# Patient Record
Sex: MALE | Race: WHITE | Employment: FULL TIME | URBAN - METROPOLITAN AREA
[De-identification: names, ages, dates, MRNs, and addresses within clinical notes are randomized per-mention and may not be internally consistent; named-entity substitution may affect disease eponyms.]

---

## 2017-01-25 ENCOUNTER — OFFICE VISIT (OUTPATIENT)
Dept: FAMILY MEDICINE CLINIC | Age: 47
End: 2017-01-25

## 2017-01-25 VITALS
BODY MASS INDEX: 33.39 KG/M2 | TEMPERATURE: 97.9 F | HEIGHT: 65 IN | OXYGEN SATURATION: 98 % | HEART RATE: 88 BPM | RESPIRATION RATE: 18 BRPM | DIASTOLIC BLOOD PRESSURE: 90 MMHG | WEIGHT: 200.4 LBS | SYSTOLIC BLOOD PRESSURE: 140 MMHG

## 2017-01-25 DIAGNOSIS — I10 HTN (HYPERTENSION), BENIGN: Primary | ICD-10-CM

## 2017-01-25 DIAGNOSIS — E78.49 OTHER HYPERLIPIDEMIA: ICD-10-CM

## 2017-01-25 DIAGNOSIS — R73.02 GLUCOSE INTOLERANCE (IMPAIRED GLUCOSE TOLERANCE): ICD-10-CM

## 2017-01-25 DIAGNOSIS — F41.9 ANXIETY: ICD-10-CM

## 2017-01-25 DIAGNOSIS — R79.89 LOW VITAMIN D LEVEL: ICD-10-CM

## 2017-01-25 PROBLEM — E78.5 HYPERLIPIDEMIA: Status: ACTIVE | Noted: 2017-01-25

## 2017-01-25 RX ORDER — METOPROLOL SUCCINATE 100 MG/1
100 TABLET, EXTENDED RELEASE ORAL DAILY
Qty: 90 TAB | Refills: 0 | Status: SHIPPED | OUTPATIENT
Start: 2017-01-25 | End: 2017-05-01 | Stop reason: SDUPTHER

## 2017-01-25 RX ORDER — PAROXETINE HYDROCHLORIDE 20 MG/1
60 TABLET, FILM COATED ORAL DAILY
Qty: 90 TAB | Refills: 3 | Status: SHIPPED | OUTPATIENT
Start: 2017-01-25 | End: 2017-05-01 | Stop reason: SDUPTHER

## 2017-01-25 RX ORDER — ATORVASTATIN CALCIUM 20 MG/1
20 TABLET, FILM COATED ORAL DAILY
Qty: 90 TAB | Refills: 1 | Status: SHIPPED | OUTPATIENT
Start: 2017-01-25 | End: 2017-01-27

## 2017-01-25 RX ORDER — ATORVASTATIN CALCIUM 20 MG/1
TABLET, FILM COATED ORAL
COMMUNITY
Start: 2016-11-23 | End: 2017-01-25 | Stop reason: SDUPTHER

## 2017-01-25 NOTE — PROGRESS NOTES
1. Have you been to the ER, urgent care clinic since your last visit? Hospitalized since your last visit? No    2. Have you seen or consulted any other health care providers outside of the [facility] [place] since your last visit? Include any pap smears or colon screening.  No   Chief Complaint   Patient presents with    Hypertension     follow up    Cholesterol Problem     follow up    Vitamin D Deficiency     follow up    Diabetes     prediabetes follow up     Chief Complaint   Patient presents with    Hypertension     follow up    Cholesterol Problem     follow up    Vitamin D Deficiency     follow up    Diabetes     prediabetes follow up     Learning Assessment 11/27/2012   PRIMARY LEARNER Patient   PRIMARY LANGUAGE ENGLISH   LEARNER PREFERENCE PRIMARY READING   ANSWERED BY patient   RELATIONSHIP SELF

## 2017-01-25 NOTE — PROGRESS NOTES
HISTORY OF PRESENT ILLNESS  Jorge Beltran is a 40 y.o. male. HPI: Cardiovascular Review  The patient has diabetes, hypertension and hyperlipidemia. He reports taking medications as instructed, no medication side effects noted. Diet and Lifestyle: generally follows a low fat low cholesterol diet, generally follows a low sodium diet, no formal exercise but active during the day. Lab review: labs reviewed and discussed with patient. Requesting refill on his medication. Past Medical History   Diagnosis Date    Anxiety disorder     Sleep apnea 11/2015   No Known Allergies    Current Outpatient Prescriptions:     atorvastatin (LIPITOR) 20 mg tablet, Take 1 Tab by mouth daily. , Disp: 90 Tab, Rfl: 1    PARoxetine (PAXIL) 20 mg tablet, Take 3 Tabs by mouth daily. , Disp: 90 Tab, Rfl: 3    metoprolol succinate (TOPROL-XL) 100 mg tablet, Take 1 Tab by mouth daily. , Disp: 90 Tab, Rfl: 0  Review of Systems   Constitutional: Negative. Respiratory: Negative. Cardiovascular: Negative. Gastrointestinal: Negative. Genitourinary: Negative. Blood pressure 140/90, pulse 88, temperature 97.9 °F (36.6 °C), temperature source Oral, resp. rate 18, height 5' 5\" (1.651 m), weight 200 lb 6.4 oz (90.9 kg), SpO2 98 %. Physical Exam   Constitutional: No distress. HENT:   Mouth/Throat: Oropharynx is clear and moist.   Neck: Neck supple. Cardiovascular: Normal rate and regular rhythm. No murmur heard. Pulmonary/Chest: Effort normal and breath sounds normal.   Abdominal: Soft. Bowel sounds are normal.   Nursing note and vitals reviewed. ASSESSMENT and PLAN    ICD-10-CM ICD-9-CM    1. HTN (hypertension), benign I10 401.1 CBC W/O DIFF      metoprolol succinate (TOPROL-XL) 100 mg tablet   2. Other hyperlipidemia E78.4 272.4 LIPID PANEL      METABOLIC PANEL, COMPREHENSIVE      atorvastatin (LIPITOR) 20 mg tablet   3.  Low vitamin D level E55.9 268.9 VITAMIN D, 25 HYDROXY   4. Glucose intolerance (impaired glucose tolerance) R73.02 790.22 HEMOGLOBIN A1C WITH EAG   5.  Anxiety F41.9 300.00 PARoxetine (PAXIL) 20 mg tablet   await labs, refill medication  Follow up in six months  Pt was given an after visit summary which includes diagnosis, current medicines and vital and voiced understanding of treatment plan

## 2017-01-26 LAB
25(OH)D3+25(OH)D2 SERPL-MCNC: 21.8 NG/ML (ref 30–100)
ALBUMIN SERPL-MCNC: 4.4 G/DL (ref 3.5–5.5)
ALBUMIN/GLOB SERPL: 1.7 {RATIO} (ref 1.1–2.5)
ALP SERPL-CCNC: 76 IU/L (ref 39–117)
ALT SERPL-CCNC: 33 IU/L (ref 0–44)
AST SERPL-CCNC: 26 IU/L (ref 0–40)
BILIRUB SERPL-MCNC: 0.2 MG/DL (ref 0–1.2)
BUN SERPL-MCNC: 20 MG/DL (ref 6–24)
BUN/CREAT SERPL: 19 (ref 9–20)
CALCIUM SERPL-MCNC: 9.4 MG/DL (ref 8.7–10.2)
CHLORIDE SERPL-SCNC: 102 MMOL/L (ref 96–106)
CHOLEST SERPL-MCNC: 216 MG/DL (ref 100–199)
CO2 SERPL-SCNC: 19 MMOL/L (ref 18–29)
CREAT SERPL-MCNC: 1.04 MG/DL (ref 0.76–1.27)
ERYTHROCYTE [DISTWIDTH] IN BLOOD BY AUTOMATED COUNT: 12.9 % (ref 12.3–15.4)
EST. AVERAGE GLUCOSE BLD GHB EST-MCNC: 137 MG/DL
GLOBULIN SER CALC-MCNC: 2.6 G/DL (ref 1.5–4.5)
GLUCOSE SERPL-MCNC: 139 MG/DL (ref 65–99)
HBA1C MFR BLD: 6.4 % (ref 4.8–5.6)
HCT VFR BLD AUTO: 40.7 % (ref 37.5–51)
HDLC SERPL-MCNC: 50 MG/DL
HGB BLD-MCNC: 14.1 G/DL (ref 12.6–17.7)
INTERPRETATION, 910389: NORMAL
LDLC SERPL CALC-MCNC: 153 MG/DL (ref 0–99)
MCH RBC QN AUTO: 29.5 PG (ref 26.6–33)
MCHC RBC AUTO-ENTMCNC: 34.6 G/DL (ref 31.5–35.7)
MCV RBC AUTO: 85 FL (ref 79–97)
PLATELET # BLD AUTO: 285 X10E3/UL (ref 150–379)
POTASSIUM SERPL-SCNC: 4.4 MMOL/L (ref 3.5–5.2)
PROT SERPL-MCNC: 7 G/DL (ref 6–8.5)
RBC # BLD AUTO: 4.78 X10E6/UL (ref 4.14–5.8)
SODIUM SERPL-SCNC: 139 MMOL/L (ref 134–144)
TRIGL SERPL-MCNC: 64 MG/DL (ref 0–149)
VLDLC SERPL CALC-MCNC: 13 MG/DL (ref 5–40)
WBC # BLD AUTO: 8.8 X10E3/UL (ref 3.4–10.8)

## 2017-01-27 ENCOUNTER — TELEPHONE (OUTPATIENT)
Dept: FAMILY MEDICINE CLINIC | Age: 47
End: 2017-01-27

## 2017-01-27 PROBLEM — R73.02 GLUCOSE INTOLERANCE (IMPAIRED GLUCOSE TOLERANCE): Status: ACTIVE | Noted: 2017-01-27

## 2017-01-27 RX ORDER — ATORVASTATIN CALCIUM 40 MG/1
40 TABLET, FILM COATED ORAL DAILY
Qty: 30 TAB | Refills: 3 | Status: SHIPPED | OUTPATIENT
Start: 2017-01-27 | End: 2017-05-01 | Stop reason: SDUPTHER

## 2017-05-01 ENCOUNTER — OFFICE VISIT (OUTPATIENT)
Dept: FAMILY MEDICINE CLINIC | Age: 47
End: 2017-05-01

## 2017-05-01 VITALS
BODY MASS INDEX: 33.79 KG/M2 | OXYGEN SATURATION: 98 % | DIASTOLIC BLOOD PRESSURE: 82 MMHG | HEART RATE: 80 BPM | TEMPERATURE: 98 F | RESPIRATION RATE: 18 BRPM | WEIGHT: 202.8 LBS | SYSTOLIC BLOOD PRESSURE: 122 MMHG | HEIGHT: 65 IN

## 2017-05-01 DIAGNOSIS — F41.9 ANXIETY: ICD-10-CM

## 2017-05-01 DIAGNOSIS — I10 HTN (HYPERTENSION), BENIGN: Primary | ICD-10-CM

## 2017-05-01 DIAGNOSIS — R73.02 GLUCOSE INTOLERANCE (IMPAIRED GLUCOSE TOLERANCE): ICD-10-CM

## 2017-05-01 DIAGNOSIS — E78.2 MIXED HYPERLIPIDEMIA: ICD-10-CM

## 2017-05-01 RX ORDER — ATORVASTATIN CALCIUM 40 MG/1
40 TABLET, FILM COATED ORAL DAILY
Qty: 90 TAB | Refills: 1 | Status: SHIPPED | OUTPATIENT
Start: 2017-05-01 | End: 2017-10-24 | Stop reason: SDUPTHER

## 2017-05-01 RX ORDER — METOPROLOL SUCCINATE 100 MG/1
100 TABLET, EXTENDED RELEASE ORAL DAILY
Qty: 90 TAB | Refills: 1 | Status: SHIPPED | OUTPATIENT
Start: 2017-05-01 | End: 2017-10-24 | Stop reason: SDUPTHER

## 2017-05-01 RX ORDER — PAROXETINE HYDROCHLORIDE 20 MG/1
60 TABLET, FILM COATED ORAL DAILY
Qty: 90 TAB | Refills: 3 | Status: SHIPPED | OUTPATIENT
Start: 2017-05-01 | End: 2017-10-24 | Stop reason: SDUPTHER

## 2017-05-01 NOTE — PROGRESS NOTES
1. Have you been to the ER, urgent care clinic since your last visit? Hospitalized since your last visit? No    2. Have you seen or consulted any other health care providers outside of the [address] [place] [address] since your last visit? Include any pap smears or colon screening.  No     Chief Complaint   Patient presents with    Medication Evaluation     patient is here for [facility] evaluation     Learning Assessment 11/27/2012   PRIMARY LEARNER Patient   PRIMARY LANGUAGE ENGLISH   LEARNER PREFERENCE PRIMARY READING   ANSWERED BY patient   RELATIONSHIP SELF

## 2017-05-01 NOTE — MR AVS SNAPSHOT
Visit Information Date & Time Provider Department Dept. Phone Encounter #  
 5/1/2017  8:00 AM Lashae Stokes  [facility] [phone] [id number] Upcoming Health Maintenance Date Due DTaP/Tdap/Td series (1 - Tdap) 9/9/1992 INFLUENZA AGE 9 TO ADULT 8/1/2017 Allergies as of 5/1/2017  Review Complete On: 0/0/3978 By: Gloria Fabian LPN No Known Allergies Current Immunizations  Reviewed on 8/30/2016 Name Date Influenza Vaccine 12/1/2015 Not reviewed this visit You Were Diagnosed With   
  
 Codes Comments HTN (hypertension), benign    -  Primary ICD-10-CM: I10 
ICD-9-CM: 401.1 Mixed hyperlipidemia     ICD-10-CM: E78.2 ICD-9-CM: 272.2 Glucose intolerance (impaired glucose tolerance)     ICD-10-CM: R73.02 
ICD-9-CM: 790.22 Anxiety     ICD-10-CM: F41.9 ICD-9-CM: 300.00 Vitals BP Pulse Temp Resp Height(growth percentile) Weight(growth percentile) 122/82 (BP 1 Location: Left arm, BP Patient Position: Sitting) 80 98 °F (36.7 °C) (Oral) 18 5' 5\" (1.651 m) 202 lb 12.8 oz (92 kg) SpO2 BMI Smoking Status 98% 33.75 kg/m2 Never Smoker BMI and BSA Data Body Mass Index Body Surface Area 33.75 kg/m 2 2.05 m 2 Preferred Pharmacy Pharmacy Name Phone [facility] [address] [facility], [address] [place] [address],[facility] Floor [phone] Your Updated Medication List  
  
   
This list is accurate as of: 5/1/17  8:19 AM.  Always use your most recent med list.  
  
  
  
  
 atorvastatin 40 mg tablet Commonly known as:  LIPITOR Take 1 Tab by mouth daily. metoprolol succinate 100 mg tablet Commonly known as:  TOPROL-XL Take 1 Tab by mouth daily. PARoxetine 20 mg tablet Commonly known as:  PAXIL Take 3 Tabs by mouth daily. Prescriptions Sent to Pharmacy  Refills  
 atorvastatin (LIPITOR) 40 mg tablet 1  
 Sig: Take 1 Tab by mouth daily. Class: Normal  
 Pharmacy: [id number] [place] [address], [facility] Floor Ph #: [phone] Route: Oral  
 PARoxetine (PAXIL) 20 mg tablet 3 Sig: Take 3 Tabs by mouth daily. Class: Normal  
 Pharmacy: [id number] [place] [address], [address] [place] [address],[facility] Floor Ph #: [phone] Route: Oral  
 metoprolol succinate (TOPROL-XL) 100 mg tablet 1 Sig: Take 1 Tab by mouth daily. Class: Normal  
 Pharmacy: [id number] [place] [address], [address] [place] [address],[address] [place] [address] Ph #: [phone] Route: Oral  
  
We Performed the Following AMB POC HEMOGLOBIN A1C [44741 CPT(R)] CBC W/O DIFF [97226 CPT(R)] LIPID PANEL [46440 CPT(R)] METABOLIC PANEL, COMPREHENSIVE [41766 CPT(R)] Introducing [facility]! Dear Robert Enriquez: Thank you for requesting a GiftMe account. Our records indicate that you have previously registered for a GiftMe account but its currently inactive. Please call our GiftMe support line at [phone]. Additional Information If you have questions, please visit the Frequently Asked Questions section of the GiftMe website at [email or web address] Remember, GiftMe is NOT to be used for urgent needs. For medical emergencies, dial 911. Now available from your iPhone and Android! Please provide this summary of care documentation to your next provider. Your primary care clinician is listed as Lynsey VEGA. If you have any questions after today's visit, please call [phone].

## 2017-05-01 NOTE — PROGRESS NOTES
HISTORY OF PRESENT ILLNESS  Merline Greulich is a 40 y.o. male. HPI:Cardiovascular Review  The patient has diabetes, hypertension and hyperlipidemia. He reports taking medications as instructed, no medication side effects noted. Diet and Lifestyle: generally follows a low fat low cholesterol diet, generally follows a low sodium diet, no formal exercise but active during the day. Lab review: labs reviewed and discussed with patient. Anxiety: takes Paxil for anxiety, doing well on current dose, requesting refill. Patient will behaving cataract surgery in the next two months, will be following up for pre op physical  Past Medical History:   Diagnosis Date    Anxiety disorder     Cataracts, both eyes     Sleep apnea 11/2015   No Known Allergies    Current Outpatient Prescriptions:     atorvastatin (LIPITOR) 40 mg tablet, Take 1 Tab by mouth daily. , Disp: 90 Tab, Rfl: 1    PARoxetine (PAXIL) 20 mg tablet, Take 3 Tabs by mouth daily. , Disp: 90 Tab, Rfl: 3    metoprolol succinate (TOPROL-XL) 100 mg tablet, Take 1 Tab by mouth daily. , Disp: 90 Tab, Rfl: 1  Review of Systems   Constitutional: Negative. Respiratory: Negative. Cardiovascular: Negative. Gastrointestinal: Negative. Psychiatric/Behavioral: The patient is nervous/anxious. Blood pressure 122/82, pulse 80, temperature 98 °F (36.7 °C), temperature source Oral, resp. rate 18, height 5' 5\" (1.651 m), weight 202 lb 12.8 oz (92 kg), SpO2 98 %. Physical Exam   Constitutional: No distress. HENT:   Mouth/Throat: Oropharynx is clear and moist.   Neck: Neck supple. Cardiovascular: Normal rate and regular rhythm. No murmur heard. Pulmonary/Chest: Effort normal and breath sounds normal.   Abdominal: Soft. Bowel sounds are normal.   Nursing note and vitals reviewed. ASSESSMENT and PLAN    ICD-10-CM ICD-9-CM    1. HTN (hypertension), benign I10 401.1 CBC W/O DIFF      metoprolol succinate (TOPROL-XL) 100 mg tablet   2.  Mixed hyperlipidemia E78.2 272.2 LIPID PANEL      METABOLIC PANEL, COMPREHENSIVE   3. Glucose intolerance (impaired glucose tolerance) R73.02 790.22 AMB POC HEMOGLOBIN A1C   4.  Anxiety F41.9 300.00 PARoxetine (PAXIL) 20 mg tablet   Await labs  Refill medication  Pt was given an after visit summary which includes diagnosis, current medicines and vital and voiced understanding of treatment plan

## 2017-05-02 DIAGNOSIS — R73.02 GLUCOSE INTOLERANCE (IMPAIRED GLUCOSE TOLERANCE): Primary | ICD-10-CM

## 2017-05-02 LAB
ALBUMIN SERPL-MCNC: 4.3 G/DL (ref 3.5–5.5)
ALBUMIN/GLOB SERPL: 1.7 {RATIO} (ref 1.2–2.2)
ALP SERPL-CCNC: 80 IU/L (ref 39–117)
ALT SERPL-CCNC: 26 IU/L (ref 0–44)
AST SERPL-CCNC: 26 IU/L (ref 0–40)
BILIRUB SERPL-MCNC: 0.4 MG/DL (ref 0–1.2)
BUN SERPL-MCNC: 21 MG/DL (ref 6–24)
BUN/CREAT SERPL: 18 (ref 9–20)
CALCIUM SERPL-MCNC: 8.9 MG/DL (ref 8.7–10.2)
CHLORIDE SERPL-SCNC: 101 MMOL/L (ref 96–106)
CHOLEST SERPL-MCNC: 157 MG/DL (ref 100–199)
CO2 SERPL-SCNC: 21 MMOL/L (ref 18–29)
CREAT SERPL-MCNC: 1.14 MG/DL (ref 0.76–1.27)
ERYTHROCYTE [DISTWIDTH] IN BLOOD BY AUTOMATED COUNT: 13.4 % (ref 12.3–15.4)
GLOBULIN SER CALC-MCNC: 2.5 G/DL (ref 1.5–4.5)
GLUCOSE SERPL-MCNC: 134 MG/DL (ref 65–99)
HCT VFR BLD AUTO: 41.7 % (ref 37.5–51)
HDLC SERPL-MCNC: 48 MG/DL
HGB BLD-MCNC: 14 G/DL (ref 12.6–17.7)
INTERPRETATION, 910389: NORMAL
LDLC SERPL CALC-MCNC: 88 MG/DL (ref 0–99)
MCH RBC QN AUTO: 29.1 PG (ref 26.6–33)
MCHC RBC AUTO-ENTMCNC: 33.6 G/DL (ref 31.5–35.7)
MCV RBC AUTO: 87 FL (ref 79–97)
PLATELET # BLD AUTO: 262 X10E3/UL (ref 150–379)
POTASSIUM SERPL-SCNC: 4.5 MMOL/L (ref 3.5–5.2)
PROT SERPL-MCNC: 6.8 G/DL (ref 6–8.5)
RBC # BLD AUTO: 4.81 X10E6/UL (ref 4.14–5.8)
SODIUM SERPL-SCNC: 138 MMOL/L (ref 134–144)
TRIGL SERPL-MCNC: 107 MG/DL (ref 0–149)
VLDLC SERPL CALC-MCNC: 21 MG/DL (ref 5–40)
WBC # BLD AUTO: 4.8 X10E3/UL (ref 3.4–10.8)

## 2017-07-18 ENCOUNTER — OFFICE VISIT (OUTPATIENT)
Dept: FAMILY MEDICINE CLINIC | Age: 47
End: 2017-07-18

## 2017-07-18 VITALS
TEMPERATURE: 98.2 F | SYSTOLIC BLOOD PRESSURE: 140 MMHG | HEART RATE: 75 BPM | DIASTOLIC BLOOD PRESSURE: 98 MMHG | WEIGHT: 201.4 LBS | HEIGHT: 65 IN | RESPIRATION RATE: 18 BRPM | BODY MASS INDEX: 33.55 KG/M2 | OXYGEN SATURATION: 97 %

## 2017-07-18 DIAGNOSIS — R06.81 APNEA: ICD-10-CM

## 2017-07-18 DIAGNOSIS — Z01.811 PRE-OP CHEST EXAM: Primary | ICD-10-CM

## 2017-07-18 DIAGNOSIS — R73.02 GLUCOSE INTOLERANCE (IMPAIRED GLUCOSE TOLERANCE): ICD-10-CM

## 2017-07-18 PROBLEM — Z99.89 CPAP (CONTINUOUS POSITIVE AIRWAY PRESSURE) DEPENDENCE: Status: ACTIVE | Noted: 2017-07-18

## 2017-07-18 NOTE — PROGRESS NOTES
Preoperative Evaluation    Date of Exam: 2017    Ashlie Polo is a 40 y.o. male (:1970) who presents for preoperative evaluation. Latex Allergy: no    Medical History:     Past Medical History:   Diagnosis Date    Anxiety disorder     Cataracts, both eyes     Sleep apnea 2015     Allergies:   No Known Allergies   Medications:     Current Outpatient Prescriptions   Medication Sig    atorvastatin (LIPITOR) 40 mg tablet Take 1 Tab by mouth daily.  PARoxetine (PAXIL) 20 mg tablet Take 3 Tabs by mouth daily.  metoprolol succinate (TOPROL-XL) 100 mg tablet Take 1 Tab by mouth daily. No current facility-administered medications for this visit. Surgical History:   History reviewed. No pertinent surgical history. Social History:     Social History     Social History    Marital status:      Spouse name: N/A    Number of children: N/A    Years of education: N/A     Social History Main Topics    Smoking status: Never Smoker    Smokeless tobacco: Never Used    Alcohol use 0.0 oz/week     1 - 2 Cans of beer per week      Comment: socially    Drug use: No    Sexual activity: Not Asked     Other Topics Concern    None     Social History Narrative       Anesthesia Complications: None  History of abnormal bleeding : None  History of Blood Transfusions: no  Health Care Directive or Living Will: unknown    Objective:     ROS:    Feeling well. No dyspnea or chest pain on exertion. No abdominal pain, change in bowel habits, black or bloody stools. No urinary tract or prostatic symptoms. No neurological complaints. OBJECTIVE:   The patient appears well, alert, oriented x 3, in no distress. Visit Vitals    BP (!) 140/98 (BP 1 Location: Right arm, BP Patient Position: Sitting)    Pulse 75    Temp 98.2 °F (36.8 °C) (Oral)    Resp 18    Ht 5' 5\" (1.651 m)    Wt 201 lb 6.4 oz (91.4 kg)    SpO2 97%    BMI 33.51 kg/m2     ENT normal.  Neck supple. No adenopathy or thyromegaly. CARLIE.   Lungs are clear, good air entry, no wheezes, rhonchi or rales. Cardiovascular:S1 and S2 normal, no murmurs, regular rate and rhythm. Abdomen is soft without tenderness, guarding, mass or organomegaly.  exam: not examined. Extremities show no edema, normal peripheral pulses. Neurological is normal without focal findings. DIAGNOSTICS:   1. EKG: not done  2. CXR: not done  3. Labs:   Lab Results  Component Value Date/Time   Hemoglobin A1c 6.4 01/25/2017 11:07 AM   Hemoglobin A1c 6.3 08/02/2016 08:51 AM   Hemoglobin A1c 6.0 02/16/2016 01:49 PM   Glucose 134 05/01/2017 08:23 AM   LDL, calculated 88 05/01/2017 08:23 AM   Creatinine 1.14 05/01/2017 08:23 AM      Lab Results  Component Value Date/Time   ALT (SGPT) 26 05/01/2017 08:23 AM   AST (SGOT) 26 05/01/2017 08:23 AM   Alk.  phosphatase 80 05/01/2017 08:23 AM   Bilirubin, direct 0.08 08/02/2016 08:51 AM   Bilirubin, total 0.4 05/01/2017 08:23 AM   Albumin 4.3 05/01/2017 08:23 AM   Protein, total 6.8 05/01/2017 08:23 AM   PLATELET 015 31/71/8298 08:23 AM       Lab Results  Component Value Date/Time   GFR est non-AA 77 05/01/2017 08:23 AM   GFR est AA 89 05/01/2017 08:23 AM   Creatinine 1.14 05/01/2017 08:23 AM   BUN 21 05/01/2017 08:23 AM   Sodium 138 05/01/2017 08:23 AM   Potassium 4.5 05/01/2017 08:23 AM   Chloride 101 05/01/2017 08:23 AM   CO2 21 05/01/2017 08:23 AM     IMPRESSION: Patient has sleep apnea, doesn't  use his CPAP machine at home, please watch for breating  Low risk for planned surgery  No contraindications to planned surgeryLiu Garcia NP   7/18/2017

## 2017-07-18 NOTE — PROGRESS NOTES
Chief Complaint   Patient presents with    Pre-op Exam     cataract - right eye next Monday 7/24,2017 and having left one done August 4 th     1. Have you been to the ER, urgent care clinic since your last visit? Hospitalized since your last visit? No    2. Have you seen or consulted any other health care providers outside of the [address] [place] [address] since your last visit? Include any pap smears or colon screening.    Yes Dr Kira Alfredo - opthamlogist

## 2017-07-19 LAB
EST. AVERAGE GLUCOSE BLD GHB EST-MCNC: 134 MG/DL
HBA1C MFR BLD: 6.3 % (ref 4.8–5.6)

## 2017-10-24 ENCOUNTER — OFFICE VISIT (OUTPATIENT)
Dept: FAMILY MEDICINE CLINIC | Age: 47
End: 2017-10-24

## 2017-10-24 VITALS
SYSTOLIC BLOOD PRESSURE: 134 MMHG | RESPIRATION RATE: 18 BRPM | TEMPERATURE: 98.5 F | DIASTOLIC BLOOD PRESSURE: 104 MMHG | BODY MASS INDEX: 34.45 KG/M2 | HEART RATE: 90 BPM | OXYGEN SATURATION: 98 % | WEIGHT: 206.8 LBS | HEIGHT: 65 IN

## 2017-10-24 DIAGNOSIS — I10 HTN (HYPERTENSION), BENIGN: Primary | ICD-10-CM

## 2017-10-24 DIAGNOSIS — R73.02 GLUCOSE INTOLERANCE (IMPAIRED GLUCOSE TOLERANCE): ICD-10-CM

## 2017-10-24 DIAGNOSIS — E66.9 OBESITY (BMI 30-39.9): ICD-10-CM

## 2017-10-24 DIAGNOSIS — F41.9 ANXIETY: ICD-10-CM

## 2017-10-24 DIAGNOSIS — E78.49 OTHER HYPERLIPIDEMIA: ICD-10-CM

## 2017-10-24 RX ORDER — ATORVASTATIN CALCIUM 40 MG/1
40 TABLET, FILM COATED ORAL DAILY
Qty: 90 TAB | Refills: 1 | Status: SHIPPED | OUTPATIENT
Start: 2017-10-24 | End: 2018-06-19 | Stop reason: SDUPTHER

## 2017-10-24 RX ORDER — LISINOPRIL 10 MG/1
10 TABLET ORAL DAILY
Qty: 30 TAB | Refills: 2 | Status: SHIPPED | OUTPATIENT
Start: 2017-10-24 | End: 2017-12-05

## 2017-10-24 RX ORDER — PAROXETINE HYDROCHLORIDE 20 MG/1
60 TABLET, FILM COATED ORAL DAILY
Qty: 90 TAB | Refills: 3 | Status: SHIPPED | OUTPATIENT
Start: 2017-10-24 | End: 2018-04-02 | Stop reason: SDUPTHER

## 2017-10-24 RX ORDER — METOPROLOL SUCCINATE 100 MG/1
100 TABLET, EXTENDED RELEASE ORAL DAILY
Qty: 90 TAB | Refills: 1 | Status: SHIPPED | OUTPATIENT
Start: 2017-10-24 | End: 2018-06-19 | Stop reason: SDUPTHER

## 2017-10-24 NOTE — MR AVS SNAPSHOT
Visit Information Date & Time Provider Department Dept. Phone Encounter #  
 10[facility]24[facility]2017  3[phone] [id number] Upcoming Health Maintenance Date Due DTaP/Tdap/Td series (2 - Td) 10/24/2027 Allergies as of 10/24/2017  Review Complete On: 10/24/2017 By: Maynor Chen NP No Known Allergies Current Immunizations  Reviewed on 8/30/2016 Name Date Influenza Vaccine 12/1/2015 Not reviewed this visit You Were Diagnosed With   
  
 Codes Comments HTN (hypertension), benign    -  Primary ICD-10-CM: I10 
ICD-9-CM: 401.1 Other hyperlipidemia     ICD-10-CM: E78.4 ICD-9-CM: 272.4 Glucose intolerance (impaired glucose tolerance)     ICD-10-CM: R73.02 
ICD-9-CM: 790.22 Obesity (BMI 30-39. 9)     ICD-10-CM: E66.9 ICD-9-CM: 278.00 Anxiety     ICD-10-CM: F41.9 ICD-9-CM: 300.00 Vitals BP Pulse Temp Resp Height(growth percentile) Weight(growth percentile) (!) 134/104 (BP 1 Location: Left arm, BP Patient Position: Sitting) 90 98.5 °F (36.9 °C) (Oral) 18 5' 5\" (1.651 m) 206 lb 12.8 oz (93.8 kg) SpO2 BMI Smoking Status 98% 34.41 kg/m2 Never Smoker Vitals History BMI and BSA Data Body Mass Index Body Surface Area 34.41 kg/m 2 2.07 m 2 Preferred Pharmacy Pharmacy Name Phone [facility] [address] [facility], [address] [place] [address],[facility] Floor [phone] Your Updated Medication List  
  
   
This list is accurate as of: 10/24/17  3:39 PM.  Always use your most recent med list.  
  
  
  
  
 atorvastatin 40 mg tablet Commonly known as:  LIPITOR Take 1 Tab by mouth daily. metoprolol succinate 100 mg tablet Commonly known as:  TOPROL-XL Take 1 Tab by mouth daily. PARoxetine 20 mg tablet Commonly known as:  PAXIL Take 3 Tabs by mouth daily. Prescriptions Sent to Pharmacy Refills atorvastatin (LIPITOR) 40 mg tablet 1 Sig: Take 1 Tab by mouth daily. Class: Normal  
 Pharmacy: [facility] [address] [place] [address], [address] [place] [address] Ph #: [phone] Route: Oral  
 PARoxetine (PAXIL) 20 mg tablet 3 Sig: Take 3 Tabs by mouth daily. Class: Normal  
 Pharmacy: [facility] [address] [place] [address], [address] [place] [address] Ph #: [phone] Route: Oral  
 metoprolol succinate (TOPROL-XL) 100 mg tablet 1 Sig: Take 1 Tab by mouth daily. Class: Normal  
 Pharmacy: [facility] [address], [address] [place] [address] Ph #: [phone] Route: Oral  
  
We Performed the Following CBC W/O DIFF [71989 CPT(R)] HEMOGLOBIN A1C WITH EAG [90000 CPT(R)] LIPID PANEL [63134 CPT(R)] METABOLIC PANEL, COMPREHENSIVE [65082 CPT(R)] Introducing [facility]! Anisha Drummond introduces Evident.io patient portal. Now you can access parts of your medical record, email your doctor's office, and request medication refills online. 1. In your internet browser, go to [email or web address]. [email or web address] 2. Click on the First Time User? Click Here link in the Sign In box. You will see the New Member Sign Up page. 3. Enter your Evident.io Access Code exactly as it appears below. You will not need to use this code after youve completed the sign-up process. If you do not sign up before the expiration date, you must request a new code. · [email or web address] Access Code: [email or web address] Expires: 1/22/2018  3:37 PM 
 
4. Enter the last four digits of your Social Security Number (xxxx) and Date of Birth (mm/dd/yyyy) as indicated and click Submit. You will be taken to the next sign-up page. 5. Create a Evident.io ID. This will be your Evident.io login ID and cannot be changed, so think of one that is secure and easy to remember. 6. Create a [employer] password. You can change your password at any time. 7. Enter your Password Reset Question and Answer. This can be used at a later time if you forget your password. 8. Enter your e-mail address. You will receive e-mail notification when new information is available in [address] 9. Click Sign Up. You can now view and download portions of your medical record. 10. Click the Download Summary menu link to download a portable copy of your medical information. If you have questions, please visit the Frequently Asked Questions section of the Kolorific website. Remember, [employer]ific is NOT to be used for urgent needs. For medical emergencies, dial 911. Now available from your iPhone and Android! Please provide this summary of care documentation to your next provider. Your primary care clinician is listed as Diandra VEGA. If you have any questions after today's visit, please call [phone].

## 2017-10-24 NOTE — PROGRESS NOTES
HISTORY OF PRESENT ILLNESS  Carmelina Dakin is a 56 y.o. male. HPI:Cardiovascular Review  The patient has hypertension and hyperlipidemia. He reports taking medications as instructed, no medication side effects noted. Diet and Lifestyle: not attempting to follow a low fat, low cholesterol diet, not attempting to follow a low sodium diet, no formal exercise but active during the day. Lab review: labs reviewed and discussed with patient. His diastolic blood pressure elevated today, double check BP was 134/104    Obesity: patient has gained weight about 20 lbs in two years, admits not watching diet and not exercising. Anxiety: his anxiety is well controlled with Paxil 20 mg, requesting refill  Past Medical History:   Diagnosis Date    Anxiety disorder     Cataracts, both eyes     Sleep apnea 11/2015   No Known Allergies    Current Outpatient Prescriptions:     atorvastatin (LIPITOR) 40 mg tablet, Take 1 Tab by mouth daily. , Disp: 90 Tab, Rfl: 1    PARoxetine (PAXIL) 20 mg tablet, Take 3 Tabs by mouth daily. , Disp: 90 Tab, Rfl: 3    metoprolol succinate (TOPROL-XL) 100 mg tablet, Take 1 Tab by mouth daily. , Disp: 90 Tab, Rfl: 1    lisinopril (PRINIVIL, ZESTRIL) 10 mg tablet, Take 1 Tab by mouth daily. , Disp: 30 Tab, Rfl: 2  Review of Systems   Constitutional: Negative. Respiratory: Negative. Cardiovascular: Negative. Gastrointestinal: Negative. Blood pressure (!) 134/104, pulse 90, temperature 98.5 °F (36.9 °C), temperature source Oral, resp. rate 18, height 5' 5\" (1.651 m), weight 206 lb 12.8 oz (93.8 kg), SpO2 98 %. Physical Exam   HENT:   Mouth/Throat: Oropharynx is clear and moist.   Neck: Normal range of motion. Neck supple. Cardiovascular: Normal rate and regular rhythm. No murmur heard. Pulmonary/Chest: Effort normal and breath sounds normal.   Abdominal: Soft. Bowel sounds are normal.   Psychiatric: He has a normal mood and affect.  His behavior is normal.   Nursing note and vitals reviewed. ASSESSMENT and PLAN    ICD-10-CM ICD-9-CM    1. HTN (hypertension), benign I10 401.1 CBC W/O DIFF      metoprolol succinate (TOPROL-XL) 100 mg tablet   2. Other hyperlipidemia E78.4 272.4 LIPID PANEL   3. Glucose intolerance (impaired glucose tolerance) R73.02 790.22 HEMOGLOBIN A1C WITH EAG      METABOLIC PANEL, COMPREHENSIVE   4. Obesity (BMI 30-39. 9) E66.9 278.00 lisinopril (PRINIVIL, ZESTRIL) 10 mg tablet   5.  Anxiety F41.9 300.00 PARoxetine (PAXIL) 20 mg tablet   started on Paxil 20 mg to take 1 pill daily  Follow up in 3 weeks  Refill medication, await labs  Pt was given an after visit summary which includes diagnosis, current medicines and vital and voiced understanding of treatment plan

## 2017-10-24 NOTE — PROGRESS NOTES
1. Have you been to the ER, urgent care clinic since your last visit? Hospitalized since your last visit? No    2. Have you seen or consulted any other health care providers outside of the [address] [place] [address] since your last visit? Include any pap smears or colon screening. No     Chief Complaint   Patient presents with    Medication Refill     patient here for refill     Learning assessment complete  Abuse Screening Questionnaire 7/18/2017   Do you ever feel afraid of your partner? N   Are you in a relationship with someone who physically or mentally threatens you? N   Is it safe for you to go home? Y     Fall Risk Assessment, last 12 mths 7/18/2017   Able to walk? Yes   Fall in past 12 months?  No

## 2017-11-08 LAB
ALBUMIN SERPL-MCNC: 4.4 G/DL (ref 3.5–5.5)
ALBUMIN/GLOB SERPL: 1.6 {RATIO} (ref 1.2–2.2)
ALP SERPL-CCNC: 94 IU/L (ref 39–117)
ALT SERPL-CCNC: 32 IU/L (ref 0–44)
AST SERPL-CCNC: 24 IU/L (ref 0–40)
BILIRUB SERPL-MCNC: 0.5 MG/DL (ref 0–1.2)
BUN SERPL-MCNC: 13 MG/DL (ref 6–24)
BUN/CREAT SERPL: 13 (ref 9–20)
CALCIUM SERPL-MCNC: 9 MG/DL (ref 8.7–10.2)
CHLORIDE SERPL-SCNC: 101 MMOL/L (ref 96–106)
CHOLEST SERPL-MCNC: 143 MG/DL (ref 100–199)
CO2 SERPL-SCNC: 24 MMOL/L (ref 18–29)
CREAT SERPL-MCNC: 1.04 MG/DL (ref 0.76–1.27)
ERYTHROCYTE [DISTWIDTH] IN BLOOD BY AUTOMATED COUNT: 13 % (ref 12.3–15.4)
EST. AVERAGE GLUCOSE BLD GHB EST-MCNC: 134 MG/DL
GFR SERPLBLD CREATININE-BSD FMLA CKD-EPI: 86 ML/MIN/1.73
GFR SERPLBLD CREATININE-BSD FMLA CKD-EPI: 99 ML/MIN/1.73
GLOBULIN SER CALC-MCNC: 2.7 G/DL (ref 1.5–4.5)
GLUCOSE SERPL-MCNC: 105 MG/DL (ref 65–99)
HBA1C MFR BLD: 6.3 % (ref 4.8–5.6)
HCT VFR BLD AUTO: 41.8 % (ref 37.5–51)
HDLC SERPL-MCNC: 42 MG/DL
HGB BLD-MCNC: 14.4 G/DL (ref 12.6–17.7)
INTERPRETATION, 910389: NORMAL
LDLC SERPL CALC-MCNC: 86 MG/DL (ref 0–99)
MCH RBC QN AUTO: 29.4 PG (ref 26.6–33)
MCHC RBC AUTO-ENTMCNC: 34.4 G/DL (ref 31.5–35.7)
MCV RBC AUTO: 85 FL (ref 79–97)
PLATELET # BLD AUTO: 272 X10E3/UL (ref 150–379)
POTASSIUM SERPL-SCNC: 4.1 MMOL/L (ref 3.5–5.2)
PROT SERPL-MCNC: 7.1 G/DL (ref 6–8.5)
RBC # BLD AUTO: 4.9 X10E6/UL (ref 4.14–5.8)
SODIUM SERPL-SCNC: 141 MMOL/L (ref 134–144)
TRIGL SERPL-MCNC: 74 MG/DL (ref 0–149)
VLDLC SERPL CALC-MCNC: 15 MG/DL (ref 5–40)
WBC # BLD AUTO: 5.1 X10E3/UL (ref 3.4–10.8)

## 2017-12-05 ENCOUNTER — OFFICE VISIT (OUTPATIENT)
Dept: FAMILY MEDICINE CLINIC | Age: 47
End: 2017-12-05

## 2017-12-05 VITALS
HEIGHT: 65 IN | SYSTOLIC BLOOD PRESSURE: 119 MMHG | DIASTOLIC BLOOD PRESSURE: 80 MMHG | HEART RATE: 80 BPM | WEIGHT: 206.8 LBS | RESPIRATION RATE: 18 BRPM | TEMPERATURE: 98.7 F | OXYGEN SATURATION: 98 % | BODY MASS INDEX: 34.45 KG/M2

## 2017-12-05 DIAGNOSIS — R79.89 LOW VITAMIN D LEVEL: ICD-10-CM

## 2017-12-05 DIAGNOSIS — R53.83 OTHER FATIGUE: ICD-10-CM

## 2017-12-05 DIAGNOSIS — E66.9 OBESITY (BMI 30-39.9): ICD-10-CM

## 2017-12-05 DIAGNOSIS — I10 HTN (HYPERTENSION), BENIGN: Primary | ICD-10-CM

## 2017-12-05 RX ORDER — ASPIRIN 81 MG/1
81 TABLET ORAL DAILY
Qty: 30 TAB | Refills: 11 | Status: SHIPPED | OUTPATIENT
Start: 2017-12-05

## 2017-12-05 NOTE — MR AVS SNAPSHOT
Visit Information Date & Time Provider Department Dept. Phone Encounter #  
 12/5/2017  8:15 AM Ariel Maldonado  [facility] [phone] [id number] Upcoming Health Maintenance Date Due DTaP/Tdap/Td series (2 - Td) 10/24/2027 Allergies as of 12/5/2017  Review Complete On: 12/5/2017 By: Ariel Maldonado NP No Known Allergies Current Immunizations  Reviewed on 8/30/2016 Name Date Influenza Vaccine 12/1/2015 Not reviewed this visit You Were Diagnosed With   
  
 Codes Comments HTN (hypertension), benign    -  Primary ICD-10-CM: I10 
ICD-9-CM: 401.1 Obesity (BMI 30-39. 9)     ICD-10-CM: E66.9 ICD-9-CM: 278.00 Other fatigue     ICD-10-CM: R53.83 ICD-9-CM: 780.79 Low vitamin D level     ICD-10-CM: E55.9 ICD-9-CM: 268.9 Vitals BP Pulse Temp Resp Height(growth percentile) Weight(growth percentile) 119/80 (BP 1 Location: Right arm, BP Patient Position: Sitting) 80 98.7 °F (37.1 °C) (Oral) 18 5' 5\" (1.651 m) 206 lb 12.8 oz (93.8 kg) SpO2 BMI Smoking Status 98% 34.41 kg/m2 Never Smoker Vitals History BMI and BSA Data Body Mass Index Body Surface Area 34.41 kg/m 2 2.07 m 2 Preferred Pharmacy Pharmacy Name Phone [facility] [address] [facility], [address] [place] [address],[facility] Floor [phone] Your Updated Medication List  
  
   
This list is accurate as of: 12/5/17  8:39 AM.  Always use your most recent med list.  
  
  
  
  
 aspirin delayed-release 81 mg tablet Take 1 Tab by mouth daily. atorvastatin 40 mg tablet Commonly known as:  LIPITOR Take 1 Tab by mouth daily. metoprolol succinate 100 mg tablet Commonly known as:  TOPROL-XL Take 1 Tab by mouth daily. PARoxetine 20 mg tablet Commonly known as:  PAXIL Take 3 Tabs by mouth daily. Prescriptions Sent to Pharmacy Refills aspirin delayed-release 81 mg tablet 11 Sig: Take 1 Tab by mouth daily. Class: Normal  
 Pharmacy: [address] [facility], [address] [facility] Floor Ph #: [phone] Route: Oral  
  
We Performed the Following TESTOSTERONE, FREE & TOTAL [92013 CPT(R)] VITAMIN D, 25 HYDROXY [id number] CPT(R)] Patient Instructions Learning About Diabetes Food Guidelines Your Care Instructions Meal planning is important to manage diabetes. It helps keep your blood sugar at a target level (which you set with your doctor). You don't have to eat special foods. You can eat what your family eats, including sweets once in a while. But you do have to pay attention to how often you eat and how much you eat of certain foods. You may want to work with a dietitian or a certified diabetes educator (CDE) to help you plan meals and snacks. A dietitian or CDE can also help you lose weight if that is one of your goals. What should you know about eating carbs? Managing the amount of carbohydrate (carbs) you eat is an important part of healthy meals when you have diabetes. Carbohydrate is found in many foods. · Learn which foods have carbs. And learn the amounts of carbs in different foods. ¨ Bread, cereal, pasta, and rice have about 15 grams of carbs in a serving. A serving is 1 slice of bread (1 ounce), ½ cup of cooked cereal, or 1/3 cup of cooked pasta or rice. ¨ Fruits have 15 grams of carbs in a serving. A serving is 1 small fresh fruit, such as an apple or orange; ½ of a banana; ½ cup of cooked or canned fruit; ½ cup of fruit juice; 1 cup of melon or raspberries; or 2 tablespoons of dried fruit. ¨ Milk and no-sugar-added yogurt have 15 grams of carbs in a serving. A serving is 1 cup of milk or 2/3 cup of no-sugar-added yogurt. ¨ Starchy vegetables have 15 grams of carbs in a serving.  A serving is ½ cup of mashed potatoes or sweet potato; 1 cup winter squash; ½ of a small baked potato; ½ cup of cooked beans; or ½ cup cooked corn or green peas. · Learn how much carbs to eat each day and at each meal. A dietitian or CDE can teach you how to keep track of the amount of carbs you eat. This is called carbohydrate counting. · If you are not sure how to count carbohydrate grams, use the Plate Method to plan meals. It is a good, quick way to make sure that you have a balanced meal. It also helps you spread carbs throughout the day. ¨ Divide your plate by types of foods. Put non-starchy vegetables on half the plate, meat or other protein food on one-quarter of the plate, and a grain or starchy vegetable in the final quarter of the plate. To this you can add a small piece of fruit and 1 cup of milk or yogurt, depending on how many carbs you are supposed to eat at a meal. 
· Try to eat about the same amount of carbs at each meal. Do not \"save up\" your daily allowance of carbs to eat at one meal. 
· Proteins have very little or no carbs per serving. Examples of proteins are beef, chicken, turkey, fish, eggs, tofu, cheese, cottage cheese, and peanut butter. A serving size of meat is 3 ounces, which is about the size of a deck of cards. Examples of meat substitute serving sizes (equal to 1 ounce of meat) are 1/4 cup of cottage cheese, 1 egg, 1 tablespoon of peanut butter, and ½ cup of tofu. How can you eat out and still eat healthy? · Learn to estimate the serving sizes of foods that have carbohydrate. If you measure food at home, it will be easier to estimate the amount in a serving of restaurant food. · If the meal you order has too much carbohydrate (such as potatoes, corn, or baked beans), ask to have a low-carbohydrate food instead. Ask for a salad or green vegetables. · If you use insulin, check your blood sugar before and after eating out to help you plan how much to eat in the future.  
· If you eat more carbohydrate at a meal than you had planned, take a walk or do other exercise. This will help lower your blood sugar. What else should you know? · Limit saturated fat, such as the fat from meat and dairy products. This is a healthy choice because people who have diabetes are at higher risk of heart disease. So choose lean cuts of meat and nonfat or low-fat dairy products. Use olive or canola oil instead of butter or shortening when cooking. · Don't skip meals. Your blood sugar may drop too low if you skip meals and take insulin or certain medicines for diabetes. · Check with your doctor before you drink alcohol. Alcohol can cause your blood sugar to drop too low. Alcohol can also cause a bad reaction if you take certain diabetes medicines. Follow-up care is a key part of your treatment and safety. Be sure to make and go to all appointments, and call your doctor if you are having problems. It's also a good idea to know your test results and keep a list of the medicines you take. Where can you learn more? Go to http://juanjose-susan.info/. Enter U194 in the search box to learn more about \"Learning About Diabetes Food Guidelines. \" Current as of: March 13, 2017 Content Version: 11.4 © 5544-9598 [employer]. Care instructions adapted under license by [employer] (which disclaims liability or warranty for this information). If you have questions about a medical condition or this instruction, always ask your healthcare professional. [address] any warranty or liability for your use of this information. Introducing [facility] & HEALTH SERVICES! Fiona Lincoln introduces [employer] patient portal. Now you can access parts of your medical record, email your doctor's office, and request medication refills online. 1. In your internet browser, go to [email or web address]. [email or web address] 2. Click on the First Time User? Click Here link in the Sign In box. You will see the New Member Sign Up page. 3. Enter your [employer] Access Code exactly as it appears below. You will not need to use this code after youve completed the sign-up process. If you do not sign up before the expiration date, you must request a new code. · [employer] Access Code: [email or web address] Expires: 1/22/2018  2:37 PM 
 
4. Enter the last four digits of your Social Security Number (xxxx) and Date of Birth (mm/dd/yyyy) as indicated and click Submit. You will be taken to the next sign-up page. 5. Create a [employer] ID. This will be your [employer] login ID and cannot be changed, so think of one that is secure and easy to remember. 6. Create a [employer] password. You can change your password at any time. 7. Enter your Password Reset Question and Answer. This can be used at a later time if you forget your password. 8. Enter your e-mail address. You will receive e-mail notification when new information is available in [address] 9. Click Sign Up. You can now view and download portions of your medical record. 10. Click the Download Summary menu link to download a portable copy of your medical information. If you have questions, please visit the Frequently Asked Questions section of the [employer] website. Remember, [employer] is NOT to be used for urgent needs. For medical emergencies, dial 911. Now available from your iPhone and Android! Please provide this summary of care documentation to your next provider. Your primary care clinician is listed as [facility]. If you have any questions after today's visit, please call [phone].

## 2017-12-05 NOTE — PROGRESS NOTES
1. Have you been to the ER, urgent care clinic since your last visit? Hospitalized since your last visit? No    2. Have you seen or consulted any other health care providers outside of the [address] [place] [address] since your last visit? Include any pap smears or colon screening. No     Chief Complaint   Patient presents with    Blood Pressure Check     patient here for BP check     Learning assessment complete  Abuse Screening Questionnaire 7/18/2017   Do you ever feel afraid of your partner? N   Are you in a relationship with someone who physically or mentally threatens you? N   Is it safe for you to go home? Y     Fall Risk Assessment, last 12 mths 7/18/2017   Able to walk? Yes   Fall in past 12 months?  No

## 2017-12-05 NOTE — PROGRESS NOTES
HISTORY OF PRESENT ILLNESS  Caterina Waite is a 56 y.o. male. HPI: HTN; Following up on elevated blood pressure, last office visit his blood pressure was elevated, lisinopril 10 mg add to take one daily. He reports after taking it for three days he had cough and his throat felt itchy and thus he stopped taking it. He also reports last time he was rushing and felt nervous and it caused his blood pressure to go up. Today his blood pressure is in normal range. Obesity: He is obese, he admits that he doesn't exercise and since his separation for his wife, he has been eating fast foot. Fatigue: He Complainants of fatigue, and requesting to check his testosterone levels. He has low vitamin D, his levels needs to be checked. Past Medical History:   Diagnosis Date    Anxiety disorder     Cataracts, both eyes     Sleep apnea 11/2015   No Known Allergies    Current Outpatient Prescriptions:     aspirin delayed-release 81 mg tablet, Take 1 Tab by mouth daily. , Disp: 30 Tab, Rfl: 11    atorvastatin (LIPITOR) 40 mg tablet, Take 1 Tab by mouth daily. , Disp: 90 Tab, Rfl: 1    PARoxetine (PAXIL) 20 mg tablet, Take 3 Tabs by mouth daily. , Disp: 90 Tab, Rfl: 3    metoprolol succinate (TOPROL-XL) 100 mg tablet, Take 1 Tab by mouth daily. , Disp: 90 Tab, Rfl: 1  Review of Systems   Constitutional: Positive for malaise/fatigue. Respiratory: Negative. Cardiovascular: Negative. Gastrointestinal: Negative. Blood pressure 119/80, pulse 80, temperature 98.7 °F (37.1 °C), temperature source Oral, resp. rate 18, height 5' 5\" (1.651 m), weight 206 lb 12.8 oz (93.8 kg), SpO2 98 %. Body mass index is 34.41 kg/(m^2). Physical Exam   Constitutional: No distress. HENT:   Mouth/Throat: Oropharynx is clear and moist.   Neck: Normal range of motion. Neck supple. Cardiovascular: Normal rate and regular rhythm. No murmur heard.   Blood pressure is in normal range   Pulmonary/Chest: Effort normal and breath sounds normal. Abdominal: Soft. Bowel sounds are normal.   Nursing note and vitals reviewed. ASSESSMENT and PLAN    Diagnoses and all orders for this visit:    1. HTN (hypertension), benign, well controlled    2. Obesity (BMI 30-39. 9)    Normal BMI dicussed with the patient, advised to cut back on carbohydrates and potion control  Advised to exercise 3-4 times per week to lose weight     3. Other fatigue  -     TESTOSTERONE, FREE & TOTAL    4. Low vitamin D level  -     VITAMIN D, 25 HYDROXY    Other orders  -     aspirin delayed-release 81 mg tablet; Take 1 Tab by mouth daily.     Await labs, follow up in 3-6 months  Pt was given an after visit summary which includes diagnosis, current medicines and vital and voiced understanding of treatment plan

## 2017-12-05 NOTE — PATIENT INSTRUCTIONS

## 2017-12-06 LAB
25(OH)D3+25(OH)D2 SERPL-MCNC: 35 NG/ML (ref 30–100)
TESTOST FREE SERPL-MCNC: 7.5 PG/ML (ref 6.8–21.5)
TESTOST SERPL-MCNC: 376 NG/DL (ref 264–916)

## 2018-03-27 ENCOUNTER — OFFICE VISIT (OUTPATIENT)
Dept: FAMILY MEDICINE CLINIC | Age: 48
End: 2018-03-27

## 2018-03-27 VITALS
HEART RATE: 88 BPM | SYSTOLIC BLOOD PRESSURE: 132 MMHG | WEIGHT: 211 LBS | DIASTOLIC BLOOD PRESSURE: 90 MMHG | RESPIRATION RATE: 16 BRPM | BODY MASS INDEX: 35.16 KG/M2 | HEIGHT: 65 IN | OXYGEN SATURATION: 98 % | TEMPERATURE: 98.4 F

## 2018-03-27 DIAGNOSIS — E66.9 OBESITY (BMI 30-39.9): ICD-10-CM

## 2018-03-27 DIAGNOSIS — I10 HTN (HYPERTENSION), BENIGN: Primary | ICD-10-CM

## 2018-03-27 DIAGNOSIS — E78.49 OTHER HYPERLIPIDEMIA: ICD-10-CM

## 2018-03-27 DIAGNOSIS — R73.02 GLUCOSE INTOLERANCE (IMPAIRED GLUCOSE TOLERANCE): ICD-10-CM

## 2018-03-27 DIAGNOSIS — F41.9 ANXIETY: ICD-10-CM

## 2018-03-27 PROBLEM — E66.01 SEVERE OBESITY (BMI 35.0-39.9) WITH COMORBIDITY (HCC): Status: ACTIVE | Noted: 2018-03-27

## 2018-03-27 PROBLEM — E66.01 SEVERE OBESITY (BMI 35.0-39.9) WITH COMORBIDITY (HCC): Status: RESOLVED | Noted: 2018-03-27 | Resolved: 2018-03-27

## 2018-03-27 PROBLEM — R79.89 LOW VITAMIN D LEVEL: Status: RESOLVED | Noted: 2017-01-25 | Resolved: 2018-03-27

## 2018-03-27 RX ORDER — ASPIRIN 81 MG/1
81 TABLET ORAL DAILY
Qty: 30 TAB | Refills: 11
Start: 2018-03-27 | End: 2018-03-28

## 2018-03-27 RX ORDER — MELATONIN
1000 DAILY
COMMUNITY

## 2018-03-27 NOTE — PROGRESS NOTES
Chief Complaint   Patient presents with    Hypertension     Follow up    Cholesterol Problem     Follow up    Labs     fasting    Medication Refill     All meds     1. Have you been to the ER, urgent care clinic since your last visit? Hospitalized since your last visit? No    2. Have you seen or consulted any other health care providers outside of the [address] [place] [address] since your last visit? Include any pap smears or colon screening.  No

## 2018-03-27 NOTE — PROGRESS NOTES
HISTORY OF PRESENT ILLNESS  Jon Perry is a 56 y.o. male. HPI:Cardiovascular Review  The patient has diabetes, hypertension and hyperlipidemia. He reports taking medications as instructed, no medication side effects noted. Diet and Lifestyle: generally follows a low fat low cholesterol diet, generally follows a low sodium diet, no formal exercise but active during the day. Lab review: labs reviewed and discussed with patient. Health maintenance: Patient is obese, he doesn't exercise, but he is active during the day. He doesn't watch his diet  Past Medical History:   Diagnosis Date    Anxiety disorder     Cataracts, both eyes     Sleep apnea 11/2015   No Known Allergies    Current Outpatient Prescriptions:     cholecalciferol (VITAMIN D3) 1,000 unit tablet, Take 1,000 Units by mouth daily. , Disp: , Rfl:     aspirin delayed-release 81 mg tablet, Take 1 Tab by mouth daily. , Disp: 30 Tab, Rfl: 11    aspirin delayed-release 81 mg tablet, Take 1 Tab by mouth daily. , Disp: 30 Tab, Rfl: 11    atorvastatin (LIPITOR) 40 mg tablet, Take 1 Tab by mouth daily. , Disp: 90 Tab, Rfl: 1    PARoxetine (PAXIL) 20 mg tablet, Take 3 Tabs by mouth daily. , Disp: 90 Tab, Rfl: 3    metoprolol succinate (TOPROL-XL) 100 mg tablet, Take 1 Tab by mouth daily. , Disp: 90 Tab, Rfl: 1  Review of Systems   Constitutional: Negative. Respiratory: Negative. Cardiovascular: Negative. Gastrointestinal: Negative. Blood pressure 132/90, pulse 88, temperature 98.4 °F (36.9 °C), temperature source Oral, resp. rate 16, height 5' 5\" (1.651 m), weight 211 lb (95.7 kg), SpO2 98 %. Body mass index is 35.11 kg/(m^2). Physical Exam   Constitutional: No distress. HENT:   Mouth/Throat: Oropharynx is clear and moist.   Neck: Normal range of motion. Neck supple. Cardiovascular: Normal rate and regular rhythm. No murmur heard. Pulmonary/Chest: Effort normal and breath sounds normal.   Abdominal: Soft.  Bowel sounds are normal. Nursing note and vitals reviewed. ASSESSMENT and PLAN  Diagnoses and all orders for this visit:    1. HTN (hypertension), benign  -     CBC W/O DIFF    2. Other hyperlipidemia  -     METABOLIC PANEL, COMPREHENSIVE  -     LIPID PANEL    3. Glucose intolerance (impaired glucose tolerance)  -     HEMOGLOBIN A1C WITH EAG    4. Anxiety    5. Obesity (BMI 30-39. 9)      Normal BMI discussed, advised to watch diet and exercise  Other orders  -     aspirin delayed-release 81 mg tablet; Take 1 Tab by mouth daily.     Follow up in six months for CPE  Pt was given an after visit summary which includes diagnosis, current medicines and vital and voiced understanding of treatment plan

## 2018-03-27 NOTE — MR AVS SNAPSHOT
[address] 
[phone] Patient: López Diaz MRN: [id number] FCB:[birth year] Visit Information Date & Time Provider Department Dept. Phone Encounter #  
 3/27/2018  8:30 AM Noel Buck NP [facility] [phone] [id number] Upcoming Health Maintenance Date Due DTaP/Tdap/Td series (2 - Td) 10/24/2027 Allergies as of 3/27/2018  Review Complete On: 3/27/2018 By: Rhonda Liu LPN No Known Allergies Current Immunizations  Reviewed on 8/30/2016 Name Date Influenza Vaccine 12/1/2015 Not reviewed this visit You Were Diagnosed With   
  
 Codes Comments HTN (hypertension), benign    -  Primary ICD-10-CM: I10 
ICD-9-CM: 401.1 Other hyperlipidemia     ICD-10-CM: E78.4 ICD-9-CM: 272.4 Glucose intolerance (impaired glucose tolerance)     ICD-10-CM: R73.02 
ICD-9-CM: 790.22 Anxiety     ICD-10-CM: F41.9 ICD-9-CM: 300.00 Obesity (BMI 30-39. 9)     ICD-10-CM: E66.9 ICD-9-CM: 278.00 Vitals BP Pulse Temp Resp Height(growth percentile) Weight(growth percentile) (!) 136/94 (BP 1 Location: Left arm, BP Patient Position: Sitting) 88 98.4 °F (36.9 °C) (Oral) 16 5' 5\" (1.651 m) 211 lb (95.7 kg) SpO2 BMI Smoking Status 98% 35.11 kg/m2 Never Smoker BMI and BSA Data Body Mass Index Body Surface Area  
 35.11 kg/m 2 2.09 m 2 Preferred Pharmacy Pharmacy Name Phone [facility] [address] [facility], [address] [facility] Floor [phone] Your Updated Medication List  
  
   
This list is accurate as of 3/27/18  9:08 AM.  Always use your most recent med list.  
  
  
  
  
 * aspirin delayed-release 81 mg tablet Take 1 Tab by mouth daily. * aspirin delayed-release 81 mg tablet Take 1 Tab by mouth daily. atorvastatin 40 mg tablet Commonly known as:  LIPITOR Take 1 Tab by mouth daily. metoprolol succinate 100 mg tablet Commonly known as:  TOPROL-XL Take 1 Tab by mouth daily. PARoxetine 20 mg tablet Commonly known as:  PAXIL Take 3 Tabs by mouth daily. VITAMIN D3 1,000 unit tablet Generic drug:  cholecalciferol Take 1,000 Units by mouth daily. * Notice: This list has 2 medication(s) that are the same as other medications prescribed for you. Read the directions carefully, and ask your doctor or other care provider to review them with you. We Performed the Following CBC W/O DIFF [10736 CPT(R)] HEMOGLOBIN A1C WITH EAG [53156 CPT(R)] LIPID PANEL [66501 CPT(R)] METABOLIC PANEL, COMPREHENSIVE [75258 CPT(R)] Introducing [facility] & HEALTH SERVICES! Cindy Martin introduces [employer] patient portal. Now you can access parts of your medical record, email your doctor's office, and request medication refills online. 1. In your internet browser, go to [email or web address] 2. Click on the First Time User? Click Here link in the Sign In box. You will see the New Member Sign Up page. 3. Enter your [employer] Access Code exactly as it appears below. You will not need to use this code after youve completed the sign-up process. If you do not sign up before the expiration date, you must request a new code. · [employer] Access Code: [email or web address] Expires: 6/25/2018  8:40 AM 
 
4. Enter the last four digits of your Social Security Number (xxxx) and Date of Birth (mm/dd/yyyy) as indicated and click Submit. You will be taken to the next sign-up page. 5. Create a [employer] ID. This will be your [employer] login ID and cannot be changed, so think of one that is secure and easy to remember. 6. Create a [employer] password. You can change your password at any time. 7. Enter your Password Reset Question and Answer. This can be used at a later time if you forget your password. 8. Enter your e-mail address. You will receive e-mail notification when new information is available in [address] 9. Click Sign Up. You can now view and download portions of your medical record. 10. Click the Download Summary menu link to download a portable copy of your medical information. If you have questions, please visit the Frequently Asked Questions section of the [employer] website. Remember, [employer] is NOT to be used for urgent needs. For medical emergencies, dial 911. Now available from your iPhone and Android! Please provide this summary of care documentation to your next provider. Your primary care clinician is listed as [facility]. If you have any questions after today's visit, please call [phone].

## 2018-03-28 DIAGNOSIS — E11.65 TYPE 2 DIABETES MELLITUS WITH HYPERGLYCEMIA, WITHOUT LONG-TERM CURRENT USE OF INSULIN (HCC): Primary | ICD-10-CM

## 2018-03-28 PROBLEM — R73.02 GLUCOSE INTOLERANCE (IMPAIRED GLUCOSE TOLERANCE): Status: RESOLVED | Noted: 2017-01-27 | Resolved: 2018-03-28

## 2018-03-28 LAB
ALBUMIN SERPL-MCNC: 4.2 G/DL (ref 3.5–5.5)
ALBUMIN/GLOB SERPL: 1.5 {RATIO} (ref 1.2–2.2)
ALP SERPL-CCNC: 90 IU/L (ref 39–117)
ALT SERPL-CCNC: 37 IU/L (ref 0–44)
AST SERPL-CCNC: 27 IU/L (ref 0–40)
BILIRUB SERPL-MCNC: 0.2 MG/DL (ref 0–1.2)
BUN SERPL-MCNC: 17 MG/DL (ref 6–24)
BUN/CREAT SERPL: 17 (ref 9–20)
CALCIUM SERPL-MCNC: 9.4 MG/DL (ref 8.7–10.2)
CHLORIDE SERPL-SCNC: 101 MMOL/L (ref 96–106)
CHOLEST SERPL-MCNC: 128 MG/DL (ref 100–199)
CO2 SERPL-SCNC: 22 MMOL/L (ref 18–29)
CREAT SERPL-MCNC: 0.98 MG/DL (ref 0.76–1.27)
ERYTHROCYTE [DISTWIDTH] IN BLOOD BY AUTOMATED COUNT: 14.1 % (ref 12.3–15.4)
EST. AVERAGE GLUCOSE BLD GHB EST-MCNC: 140 MG/DL
GFR SERPLBLD CREATININE-BSD FMLA CKD-EPI: 106 ML/MIN/1.73
GFR SERPLBLD CREATININE-BSD FMLA CKD-EPI: 92 ML/MIN/1.73
GLOBULIN SER CALC-MCNC: 2.8 G/DL (ref 1.5–4.5)
GLUCOSE SERPL-MCNC: 142 MG/DL (ref 65–99)
HBA1C MFR BLD: 6.5 % (ref 4.8–5.6)
HCT VFR BLD AUTO: 41 % (ref 37.5–51)
HDLC SERPL-MCNC: 42 MG/DL
HGB BLD-MCNC: 13.9 G/DL (ref 13–17.7)
INTERPRETATION, 910389: NORMAL
LDLC SERPL CALC-MCNC: 73 MG/DL (ref 0–99)
MCH RBC QN AUTO: 30 PG (ref 26.6–33)
MCHC RBC AUTO-ENTMCNC: 33.9 G/DL (ref 31.5–35.7)
MCV RBC AUTO: 88 FL (ref 79–97)
PLATELET # BLD AUTO: 230 X10E3/UL (ref 150–379)
POTASSIUM SERPL-SCNC: 4.6 MMOL/L (ref 3.5–5.2)
PROT SERPL-MCNC: 7 G/DL (ref 6–8.5)
RBC # BLD AUTO: 4.64 X10E6/UL (ref 4.14–5.8)
SODIUM SERPL-SCNC: 138 MMOL/L (ref 134–144)
TRIGL SERPL-MCNC: 63 MG/DL (ref 0–149)
VLDLC SERPL CALC-MCNC: 13 MG/DL (ref 5–40)
WBC # BLD AUTO: 5.4 X10E3/UL (ref 3.4–10.8)

## 2018-03-28 RX ORDER — METFORMIN HYDROCHLORIDE 500 MG/1
500 TABLET, EXTENDED RELEASE ORAL
Qty: 30 TAB | Refills: 3 | Status: SHIPPED | OUTPATIENT
Start: 2018-03-28 | End: 2018-06-19

## 2018-04-02 DIAGNOSIS — F41.9 ANXIETY: ICD-10-CM

## 2018-04-02 RX ORDER — PAROXETINE HYDROCHLORIDE 20 MG/1
TABLET, FILM COATED ORAL
Qty: 90 TAB | Refills: 3 | Status: SHIPPED | OUTPATIENT
Start: 2018-04-02 | End: 2018-06-19 | Stop reason: SDUPTHER

## 2018-06-19 ENCOUNTER — OFFICE VISIT (OUTPATIENT)
Dept: FAMILY MEDICINE CLINIC | Age: 48
End: 2018-06-19

## 2018-06-19 VITALS
SYSTOLIC BLOOD PRESSURE: 136 MMHG | TEMPERATURE: 98.2 F | WEIGHT: 205.2 LBS | BODY MASS INDEX: 34.19 KG/M2 | HEIGHT: 65 IN | DIASTOLIC BLOOD PRESSURE: 98 MMHG | HEART RATE: 94 BPM | RESPIRATION RATE: 16 BRPM | OXYGEN SATURATION: 98 %

## 2018-06-19 DIAGNOSIS — E11.65 TYPE 2 DIABETES MELLITUS WITH HYPERGLYCEMIA, WITHOUT LONG-TERM CURRENT USE OF INSULIN (HCC): ICD-10-CM

## 2018-06-19 DIAGNOSIS — F41.9 ANXIETY: ICD-10-CM

## 2018-06-19 DIAGNOSIS — Z99.89 CPAP (CONTINUOUS POSITIVE AIRWAY PRESSURE) DEPENDENCE: ICD-10-CM

## 2018-06-19 DIAGNOSIS — I10 HTN (HYPERTENSION), BENIGN: ICD-10-CM

## 2018-06-19 DIAGNOSIS — E78.5 HYPERLIPIDEMIA, UNSPECIFIED HYPERLIPIDEMIA TYPE: Primary | ICD-10-CM

## 2018-06-19 DIAGNOSIS — E66.9 OBESITY (BMI 30-39.9): ICD-10-CM

## 2018-06-19 RX ORDER — METOPROLOL SUCCINATE 100 MG/1
100 TABLET, EXTENDED RELEASE ORAL DAILY
Qty: 90 TAB | Refills: 1 | Status: SHIPPED | OUTPATIENT
Start: 2018-06-19 | End: 2019-01-02 | Stop reason: SDUPTHER

## 2018-06-19 RX ORDER — ATORVASTATIN CALCIUM 40 MG/1
40 TABLET, FILM COATED ORAL DAILY
Qty: 90 TAB | Refills: 1 | Status: SHIPPED | OUTPATIENT
Start: 2018-06-19 | End: 2019-01-02 | Stop reason: SDUPTHER

## 2018-06-19 RX ORDER — PAROXETINE HYDROCHLORIDE 20 MG/1
TABLET, FILM COATED ORAL
Qty: 270 TAB | Refills: 1 | Status: SHIPPED | OUTPATIENT
Start: 2018-06-19 | End: 2019-01-02 | Stop reason: SDUPTHER

## 2018-06-19 NOTE — PROGRESS NOTES
Chief Complaint   Patient presents with    Diabetes     Follow up    Labs     Fasting     1. Have you been to the ER, urgent care clinic since your last visit? Hospitalized since your last visit? No    2. Have you seen or consulted any other health care providers outside of the [address] [place] [address] since your last visit? Include any pap smears or colon screening.  No

## 2018-06-19 NOTE — PROGRESS NOTES
HISTORY OF PRESENT ILLNESS  Gael Dickens is a 56 y.o. male. HPI: Following up on chronic conditions. Patient has history of hyperlipemia, mild diabetes, hypertension, obesity, anxiety and uses CPAP machine for sleep apnea. He stopped taking metformin after 2-3 days stating that it bothered my stomach. He has been watching his diet and exericing and last lost five lbs in three months  Past Medical History:   Diagnosis Date    Anxiety disorder     Cataracts, both eyes     Sleep apnea 11/2015   No Known Allergies    Current Outpatient Prescriptions:     PARoxetine (PAXIL) 20 mg tablet, TAKE THREE TABLETS BY MOUTH ONCE DAILY, Disp: 270 Tab, Rfl: 1    atorvastatin (LIPITOR) 40 mg tablet, Take 1 Tab by mouth daily. , Disp: 90 Tab, Rfl: 1    metoprolol succinate (TOPROL-XL) 100 mg tablet, Take 1 Tab by mouth daily. , Disp: 90 Tab, Rfl: 1    cholecalciferol (VITAMIN D3) 1,000 unit tablet, Take 1,000 Units by mouth daily. , Disp: , Rfl:     aspirin delayed-release 81 mg tablet, Take 1 Tab by mouth daily. , Disp: 30 Tab, Rfl: 11    Review of Systems   Constitutional: Negative. Respiratory: Negative. Cardiovascular: Negative. Gastrointestinal: Negative. Blood pressure (!) 136/98, pulse 94, temperature 98.2 °F (36.8 °C), temperature source Oral, resp. rate 16, height 5' 5\" (1.651 m), weight 205 lb 3.2 oz (93.1 kg), SpO2 98 %. Body mass index is 34.15 kg/(m^2). Physical Exam   Constitutional: No distress. HENT:   Mouth/Throat: Oropharynx is clear and moist.   Neck: Normal range of motion. Neck supple. Cardiovascular: Normal rate and regular rhythm. No murmur heard. Pulmonary/Chest: Effort normal and breath sounds normal.   Abdominal: Soft. Bowel sounds are normal.   Skin: Skin is warm and dry.    Diabetic foot exam:     Left Foot:   Visual Exam: normal    Pulse DP: 2+ (normal)   Filament test: normal sensation    Vibratory sensation: normal      Right Foot:   Visual Exam: normal    Pulse DP: 2+ (normal)   Filament test: normal sensation    Vibratory sensation: normal     Nursing note and vitals reviewed. ASSESSMENT and PLAN  Diagnoses and all orders for this visit:    1. Hyperlipidemia, unspecified hyperlipidemia type  -     atorvastatin (LIPITOR) 40 mg tablet; Take 1 Tab by mouth daily.  -     LIPID PANEL    2. Anxiety  -     PARoxetine (PAXIL) 20 mg tablet; TAKE THREE TABLETS BY MOUTH ONCE DAILY    3. HTN (hypertension), benign  -     metoprolol succinate (TOPROL-XL) 100 mg tablet; Take 1 Tab by mouth daily.  -     CBC W/O DIFF  -     METABOLIC PANEL, COMPREHENSIVE    4. Type 2 diabetes mellitus with hyperglycemia, without long-term current use of insulin (HCC)  -     HEMOGLOBIN A1C WITH EAG  -     MICROALBUMIN, UR, RAND W/ MICROALB/CREAT RATIO    5. Obesity (BMI 30-39. 9)      Normal BMI discussed, advised to continue to watch diet and exercise    6.  CPAP (continuous positive airway pressure) dependence    Pt was given an after visit summary which includes diagnosis, current medicines and vital and voiced understanding of treatment plan

## 2019-01-02 ENCOUNTER — OFFICE VISIT (OUTPATIENT)
Dept: FAMILY MEDICINE CLINIC | Age: 49
End: 2019-01-02

## 2019-01-02 VITALS
SYSTOLIC BLOOD PRESSURE: 126 MMHG | RESPIRATION RATE: 16 BRPM | HEIGHT: 65 IN | TEMPERATURE: 97.9 F | DIASTOLIC BLOOD PRESSURE: 82 MMHG | HEART RATE: 70 BPM | WEIGHT: 197.6 LBS | BODY MASS INDEX: 32.92 KG/M2 | OXYGEN SATURATION: 99 %

## 2019-01-02 DIAGNOSIS — E11.65 TYPE 2 DIABETES MELLITUS WITH HYPERGLYCEMIA, WITHOUT LONG-TERM CURRENT USE OF INSULIN (HCC): ICD-10-CM

## 2019-01-02 DIAGNOSIS — F41.9 ANXIETY: ICD-10-CM

## 2019-01-02 DIAGNOSIS — I10 HTN (HYPERTENSION), BENIGN: ICD-10-CM

## 2019-01-02 DIAGNOSIS — Z00.00 GENERAL MEDICAL EXAMINATION: Primary | ICD-10-CM

## 2019-01-02 DIAGNOSIS — E78.5 HYPERLIPIDEMIA, UNSPECIFIED HYPERLIPIDEMIA TYPE: ICD-10-CM

## 2019-01-02 RX ORDER — PAROXETINE HYDROCHLORIDE 20 MG/1
TABLET, FILM COATED ORAL
Qty: 270 TAB | Refills: 1 | Status: SHIPPED | OUTPATIENT
Start: 2019-01-02 | End: 2019-09-29 | Stop reason: SDUPTHER

## 2019-01-02 RX ORDER — METOPROLOL SUCCINATE 100 MG/1
100 TABLET, EXTENDED RELEASE ORAL DAILY
Qty: 90 TAB | Refills: 1 | Status: SHIPPED | OUTPATIENT
Start: 2019-01-02 | End: 2019-09-29 | Stop reason: SDUPTHER

## 2019-01-02 RX ORDER — ATORVASTATIN CALCIUM 40 MG/1
40 TABLET, FILM COATED ORAL DAILY
Qty: 90 TAB | Refills: 1 | Status: SHIPPED | OUTPATIENT
Start: 2019-01-02 | End: 2019-01-04

## 2019-01-02 NOTE — PROGRESS NOTES
Subjective:   Alana Anderson is a 53 y.o. male presenting for his annual checkup. ROS:  Feeling well. No dyspnea or chest pain on exertion. No abdominal pain, change in bowel habits, black or bloody stools. No urinary tract or prostatic symptoms. No neurological complaints. Specific concerns today: Patient stopped taking metformin. He has been exercising and watching his diet, he has lost 6 lbs form last office visit    Patient Active Problem List    Diagnosis Date Noted    Type 2 diabetes mellitus with hyperglycemia, without long-term current use of insulin ([facility] [address] 03/28/2018    Obesity (BMI 30-39.9) 10/24/2017    Apnea 07/18/2017    CPAP (continuous positive airway pressure) dependence 07/18/2017    HTN (hypertension), benign 01/25/2017    Hyperlipidemia 01/25/2017    Anxiety 01/25/2017     Current Outpatient Medications   Medication Sig Dispense Refill    PARoxetine (PAXIL) 20 mg tablet TAKE THREE TABLETS BY MOUTH ONCE DAILY 270 Tab 1    metoprolol succinate (TOPROL-XL) 100 mg tablet Take 1 Tab by mouth daily. 90 Tab 1    atorvastatin (LIPITOR) 40 mg tablet Take 1 Tab by mouth daily. 90 Tab 1    cholecalciferol (VITAMIN D3) 1,000 unit tablet Take 1,000 Units by mouth daily.  aspirin delayed-release 81 mg tablet Take 1 Tab by mouth daily. 30 Tab 11     No Known Allergies  Past Medical History:   Diagnosis Date    Anxiety disorder     Cataracts, both eyes     Sleep apnea 11/2015     Past Surgical History:   Procedure Laterality Date    HX HEENT Bilateral 11/2017    cataracts in both eyes. Family History   Problem Relation Age of Onset    No Known Problems Mother     Hypertension Father     Diabetes Father      Social History     Tobacco Use    Smoking status: Never Smoker    Smokeless tobacco: Never Used   Substance Use Topics    Alcohol use:  Yes     Alcohol/week: 0.0 oz     Types: 1 - 2 Cans of beer per week     Comment: socially       Objective:     Visit Vitals  /82   Pulse 70 Temp 97.9 °F (36.6 °C) (Oral)   Resp 16   Ht 5' 5\" (1.651 m)   Wt 197 lb 9.6 oz (89.6 kg)   SpO2 99%   BMI 32.88 kg/m²     The patient appears well, alert, oriented x 3, in no distress. ENT normal.  Neck supple. No adenopathy or thyromegaly. CARLIE. Lungs are clear, good air entry, no wheezes, rhonchi or rales. S1 and S2 normal, no murmurs, regular rate and rhythm. Abdomen is soft without tenderness, guarding, mass or organomegaly.  exam: no penile lesions or discharge, no testicular masses or tenderness, no hernias. Extremities show no edema, normal peripheral pulses. Neurological is normal without focal findings. Assessment/Plan:   healthy adult male  lose weight, increase physical activity, follow low fat diet, routine labs ordered. ICD-10-CM ICD-9-CM    1. General medical examination Z00.00 V70.9 TSH 3RD GENERATION      T4, FREE      PSA W/ REFLX FREE PSA   2. Hyperlipidemia, unspecified hyperlipidemia type V78.4 135.5 METABOLIC PANEL, COMPREHENSIVE      LIPID PANEL      atorvastatin (LIPITOR) 40 mg tablet   3. Type 2 diabetes mellitus with hyperglycemia, without long-term current use of insulin (HCC) E11.65 250.00 HEMOGLOBIN A1C WITH EAG     790.29    4. HTN (hypertension), benign I10 401.1 CBC W/O DIFF      metoprolol succinate (TOPROL-XL) 100 mg tablet   5. BMI 32.0-32.9,adult Z68.32 V85.32    6. Anxiety F41.9 300.00 PARoxetine (PAXIL) 20 mg tablet   . Pt was given an after visit summary which includes diagnosis, current medicines and vital and voiced understanding of treatment plan

## 2019-01-02 NOTE — PROGRESS NOTES
Chief Complaint   Patient presents with    Complete Physical     Yearly Physical.    Labs     Fasting. 1. Have you been to the ER, urgent care clinic since your last visit? Hospitalized since your last visit? No    2. Have you seen or consulted any other health care providers outside of the [address] [place] [address] since your last visit? Include any pap smears or colon screening.  No

## 2019-01-03 LAB
ALBUMIN SERPL-MCNC: 4.6 G/DL (ref 3.5–5.5)
ALBUMIN/GLOB SERPL: 1.8 {RATIO} (ref 1.2–2.2)
ALP SERPL-CCNC: 95 IU/L (ref 39–117)
ALT SERPL-CCNC: 30 IU/L (ref 0–44)
AST SERPL-CCNC: 27 IU/L (ref 0–40)
BILIRUB SERPL-MCNC: 0.9 MG/DL (ref 0–1.2)
BUN SERPL-MCNC: 17 MG/DL (ref 6–24)
BUN/CREAT SERPL: 16 (ref 9–20)
CALCIUM SERPL-MCNC: 9.6 MG/DL (ref 8.7–10.2)
CHLORIDE SERPL-SCNC: 102 MMOL/L (ref 96–106)
CHOLEST SERPL-MCNC: 151 MG/DL (ref 100–199)
CO2 SERPL-SCNC: 23 MMOL/L (ref 20–29)
CREAT SERPL-MCNC: 1.09 MG/DL (ref 0.76–1.27)
ERYTHROCYTE [DISTWIDTH] IN BLOOD BY AUTOMATED COUNT: 13.9 % (ref 12.3–15.4)
EST. AVERAGE GLUCOSE BLD GHB EST-MCNC: 143 MG/DL
GLOBULIN SER CALC-MCNC: 2.6 G/DL (ref 1.5–4.5)
GLUCOSE SERPL-MCNC: 109 MG/DL (ref 65–99)
HBA1C MFR BLD: 6.6 % (ref 4.8–5.6)
HCT VFR BLD AUTO: 41.1 % (ref 37.5–51)
HDLC SERPL-MCNC: 46 MG/DL
HGB BLD-MCNC: 13.7 G/DL (ref 13–17.7)
INTERPRETATION, 910389: NORMAL
LDLC SERPL CALC-MCNC: 83 MG/DL (ref 0–99)
MCH RBC QN AUTO: 29.3 PG (ref 26.6–33)
MCHC RBC AUTO-ENTMCNC: 33.3 G/DL (ref 31.5–35.7)
MCV RBC AUTO: 88 FL (ref 79–97)
PLATELET # BLD AUTO: 239 X10E3/UL (ref 150–379)
POTASSIUM SERPL-SCNC: 4.1 MMOL/L (ref 3.5–5.2)
PROT SERPL-MCNC: 7.2 G/DL (ref 6–8.5)
PSA SERPL-MCNC: 0.4 NG/ML (ref 0–4)
RBC # BLD AUTO: 4.68 X10E6/UL (ref 4.14–5.8)
REFLEX CRITERIA: NORMAL
SODIUM SERPL-SCNC: 139 MMOL/L (ref 134–144)
T4 FREE SERPL-MCNC: 0.98 NG/DL (ref 0.82–1.77)
TRIGL SERPL-MCNC: 108 MG/DL (ref 0–149)
TSH SERPL DL<=0.005 MIU/L-ACNC: 1.04 UIU/ML (ref 0.45–4.5)
VLDLC SERPL CALC-MCNC: 22 MG/DL (ref 5–40)
WBC # BLD AUTO: 5.2 X10E3/UL (ref 3.4–10.8)

## 2019-01-04 ENCOUNTER — TELEPHONE (OUTPATIENT)
Dept: FAMILY MEDICINE CLINIC | Age: 49
End: 2019-01-04

## 2019-01-04 RX ORDER — ATORVASTATIN CALCIUM 20 MG/1
20 TABLET, FILM COATED ORAL DAILY
Qty: 90 TAB | Refills: 1 | Status: SHIPPED | OUTPATIENT
Start: 2019-01-04 | End: 2021-02-23

## 2019-01-04 NOTE — TELEPHONE ENCOUNTER
Attempted to call Pt with his lab results, unsuccessful, left him a voicemail message to call office back.

## 2019-09-23 ENCOUNTER — OFFICE VISIT (OUTPATIENT)
Dept: FAMILY MEDICINE CLINIC | Age: 49
End: 2019-09-23

## 2019-09-23 VITALS
SYSTOLIC BLOOD PRESSURE: 137 MMHG | TEMPERATURE: 98.1 F | OXYGEN SATURATION: 97 % | HEART RATE: 73 BPM | RESPIRATION RATE: 16 BRPM | DIASTOLIC BLOOD PRESSURE: 87 MMHG | HEIGHT: 65 IN | BODY MASS INDEX: 33.85 KG/M2 | WEIGHT: 203.2 LBS

## 2019-09-23 DIAGNOSIS — E11.65 TYPE 2 DIABETES MELLITUS WITH HYPERGLYCEMIA, WITHOUT LONG-TERM CURRENT USE OF INSULIN (HCC): ICD-10-CM

## 2019-09-23 DIAGNOSIS — E11.9 DIABETES MELLITUS TYPE 2, DIET-CONTROLLED (HCC): ICD-10-CM

## 2019-09-23 DIAGNOSIS — I10 HTN (HYPERTENSION), BENIGN: ICD-10-CM

## 2019-09-23 DIAGNOSIS — E78.5 HYPERLIPIDEMIA, UNSPECIFIED HYPERLIPIDEMIA TYPE: Primary | ICD-10-CM

## 2019-09-23 NOTE — PROGRESS NOTES
HISTORY OF PRESENT ILLNESS  Hamzah Rodriguez is a 51 y.o. male. HPI:Cardiovascular Review  The patient has diabetes, hypertension, hyperlipidemia and obesity. He reports taking medications as instructed, no medication side effects noted. Diet and Lifestyle: generally follows a low fat low cholesterol diet, generally follows a low sodium diet, no formal exercise but active during the day. Lab review: labs reviewed and discussed with patient. Past Medical History:   Diagnosis Date    Anxiety disorder     Cataracts, both eyes     Sleep apnea 11/2015     Past Surgical History:   Procedure Laterality Date    HX HEENT Bilateral 11/2017    cataracts in both eyes. No Known Allergies    Current Outpatient Medications:     atorvastatin (LIPITOR) 20 mg tablet, Take 1 Tab by mouth daily. , Disp: 90 Tab, Rfl: 1    PARoxetine (PAXIL) 20 mg tablet, TAKE THREE TABLETS BY MOUTH ONCE DAILY, Disp: 270 Tab, Rfl: 1    metoprolol succinate (TOPROL-XL) 100 mg tablet, Take 1 Tab by mouth daily. , Disp: 90 Tab, Rfl: 1    cholecalciferol (VITAMIN D3) 1,000 unit tablet, Take 1,000 Units by mouth daily. , Disp: , Rfl:     aspirin delayed-release 81 mg tablet, Take 1 Tab by mouth daily. , Disp: 30 Tab, Rfl: 11  Review of Systems   Constitutional: Negative. Respiratory: Negative. Cardiovascular: Negative. Gastrointestinal: Negative. Blood pressure 137/87, pulse 73, temperature 98.1 °F (36.7 °C), temperature source Oral, resp. rate 16, height 5' 5\" (1.651 m), weight 203 lb 3.2 oz (92.2 kg), SpO2 97 %. Body mass index is 33.81 kg/m². Physical Exam   Constitutional: No distress. HENT:   Mouth/Throat: Oropharynx is clear and moist.   Neck: Normal range of motion. Neck supple. Cardiovascular: Normal rate and regular rhythm. No murmur heard. Pulmonary/Chest: Effort normal and breath sounds normal.   Abdominal: Soft. Bowel sounds are normal.   Skin: Skin is warm and dry.    Diabetic foot exam:     Left Foot:   Visual Exam: normal    Pulse DP: 2+ (normal)   Filament test: normal sensation    Vibratory sensation: normal      Right Foot:   Visual Exam: normal    Pulse DP: 2+ (normal)   Filament test: normal sensation    Vibratory sensation: normal     Nursing note and vitals reviewed. ASSESSMENT and PLAN  Diagnoses and all orders for this visit:    1. Hyperlipidemia, unspecified hyperlipidemia type  -     LIPID PANEL  -     CBC W/O DIFF  2. Diabetes mellitus type 2, diet-controlled (HCC)    -     MICROALBUMIN, UR, RAND W/ MICROALB/CREAT RATIO  -     HEMOGLOBIN A1C WITH EAG    3. HTN (hypertension), benign  -     METABOLIC PANEL, COMPREHENSIVE    4.  BMI 33.0-33.9,adult    Follow up in six months for general medical exam

## 2019-09-23 NOTE — PROGRESS NOTES
Chief Complaint   Patient presents with    Medication Refill     1. Have you been to the ER, urgent care clinic since your last visit? Hospitalized since your last visit? No    2. Have you seen or consulted any other health care providers outside of the [address] [place] [address] since your last visit? Include any pap smears or colon screening.  No

## 2019-09-24 LAB
ALBUMIN SERPL-MCNC: 4.8 G/DL (ref 3.5–5.5)
ALBUMIN/CREAT UR: 4.5 MG/G CREAT (ref 0–30)
ALBUMIN/GLOB SERPL: 2.1 {RATIO} (ref 1.2–2.2)
ALP SERPL-CCNC: 78 IU/L (ref 39–117)
ALT SERPL-CCNC: 24 IU/L (ref 0–44)
AST SERPL-CCNC: 23 IU/L (ref 0–40)
BILIRUB SERPL-MCNC: 0.5 MG/DL (ref 0–1.2)
BUN SERPL-MCNC: 18 MG/DL (ref 6–24)
BUN/CREAT SERPL: 18 (ref 9–20)
CALCIUM SERPL-MCNC: 9.3 MG/DL (ref 8.7–10.2)
CHLORIDE SERPL-SCNC: 102 MMOL/L (ref 96–106)
CHOLEST SERPL-MCNC: 156 MG/DL (ref 100–199)
CO2 SERPL-SCNC: 21 MMOL/L (ref 20–29)
CREAT SERPL-MCNC: 1.01 MG/DL (ref 0.76–1.27)
CREAT UR-MCNC: 223 MG/DL
ERYTHROCYTE [DISTWIDTH] IN BLOOD BY AUTOMATED COUNT: 13.4 % (ref 12.3–15.4)
EST. AVERAGE GLUCOSE BLD GHB EST-MCNC: 134 MG/DL
GLOBULIN SER CALC-MCNC: 2.3 G/DL (ref 1.5–4.5)
GLUCOSE SERPL-MCNC: 121 MG/DL (ref 65–99)
HBA1C MFR BLD: 6.3 % (ref 4.8–5.6)
HCT VFR BLD AUTO: 40.2 % (ref 37.5–51)
HDLC SERPL-MCNC: 40 MG/DL
HGB BLD-MCNC: 13.8 G/DL (ref 13–17.7)
INTERPRETATION, 910389: NORMAL
LDLC SERPL CALC-MCNC: 95 MG/DL (ref 0–99)
MCH RBC QN AUTO: 30.3 PG (ref 26.6–33)
MCHC RBC AUTO-ENTMCNC: 34.3 G/DL (ref 31.5–35.7)
MCV RBC AUTO: 88 FL (ref 79–97)
MICROALBUMIN UR-MCNC: 10.1 UG/ML
PLATELET # BLD AUTO: 258 X10E3/UL (ref 150–450)
POTASSIUM SERPL-SCNC: 4.3 MMOL/L (ref 3.5–5.2)
PROT SERPL-MCNC: 7.1 G/DL (ref 6–8.5)
RBC # BLD AUTO: 4.55 X10E6/UL (ref 4.14–5.8)
SODIUM SERPL-SCNC: 138 MMOL/L (ref 134–144)
TRIGL SERPL-MCNC: 105 MG/DL (ref 0–149)
VLDLC SERPL CALC-MCNC: 21 MG/DL (ref 5–40)
WBC # BLD AUTO: 5.1 X10E3/UL (ref 3.4–10.8)

## 2019-09-25 ENCOUNTER — TELEPHONE (OUTPATIENT)
Dept: FAMILY MEDICINE CLINIC | Age: 49
End: 2019-09-25

## 2019-09-25 RX ORDER — METFORMIN HYDROCHLORIDE 500 MG/1
500 TABLET, EXTENDED RELEASE ORAL
Qty: 30 TAB | Refills: 5 | Status: SHIPPED | OUTPATIENT
Start: 2019-09-25 | End: 2020-05-04 | Stop reason: SDUPTHER

## 2019-09-25 NOTE — TELEPHONE ENCOUNTER
Called, spoke to pt. Two pt identifiers confirmed. Writer informed patient of abnormal lab and recommendation per NP. Also informed patient that I will mail paper work and appt. 12/30/2019 8: am. As order by NP. Pt verbalized understanding of information discussed w/ no further questions at this time.

## 2019-09-29 DIAGNOSIS — I10 HTN (HYPERTENSION), BENIGN: ICD-10-CM

## 2019-09-29 DIAGNOSIS — F41.9 ANXIETY: ICD-10-CM

## 2019-09-30 RX ORDER — METOPROLOL SUCCINATE 100 MG/1
TABLET, EXTENDED RELEASE ORAL
Qty: 90 TAB | Refills: 1 | Status: SHIPPED | OUTPATIENT
Start: 2019-09-30 | End: 2020-04-27

## 2019-09-30 RX ORDER — PAROXETINE HYDROCHLORIDE 20 MG/1
TABLET, FILM COATED ORAL
Qty: 270 TAB | Refills: 1 | Status: SHIPPED | OUTPATIENT
Start: 2019-09-30 | End: 2020-04-27

## 2019-12-31 ENCOUNTER — OFFICE VISIT (OUTPATIENT)
Dept: FAMILY MEDICINE CLINIC | Age: 49
End: 2019-12-31

## 2019-12-31 VITALS
BODY MASS INDEX: 34.32 KG/M2 | HEIGHT: 65 IN | DIASTOLIC BLOOD PRESSURE: 88 MMHG | SYSTOLIC BLOOD PRESSURE: 130 MMHG | RESPIRATION RATE: 16 BRPM | OXYGEN SATURATION: 97 % | HEART RATE: 78 BPM | TEMPERATURE: 98.6 F | WEIGHT: 206 LBS

## 2019-12-31 DIAGNOSIS — I10 HTN (HYPERTENSION), BENIGN: ICD-10-CM

## 2019-12-31 DIAGNOSIS — E78.5 HYPERLIPIDEMIA, UNSPECIFIED HYPERLIPIDEMIA TYPE: Primary | ICD-10-CM

## 2019-12-31 DIAGNOSIS — Z23 ENCOUNTER FOR IMMUNIZATION: ICD-10-CM

## 2019-12-31 DIAGNOSIS — E11.65 TYPE 2 DIABETES MELLITUS WITH HYPERGLYCEMIA, WITHOUT LONG-TERM CURRENT USE OF INSULIN (HCC): ICD-10-CM

## 2019-12-31 NOTE — PROGRESS NOTES
HISTORY OF PRESENT ILLNESS  Yo Varela is a 51 y.o. male. HPI:Cardiovascular Review  The patient has diabetes, hypertension, hyperlipidemia and obesity. He reports taking medications as instructed, no medication side effects noted. Diet and Lifestyle: generally follows a low fat low cholesterol diet, generally follows a low sodium diet, no formal exercise but active during the day. Lab review: labs reviewed and discussed with patient. He works out side, requesting TDaP vaccine  Past Medical History:   Diagnosis Date    Anxiety disorder     Cataracts, both eyes     Sleep apnea 11/2015     Past Surgical History:   Procedure Laterality Date    HX HEENT Bilateral 11/2017    cataracts in both eyes. No Known Allergies    Current Outpatient Medications:     metoprolol succinate (TOPROL-XL) 100 mg tablet, TAKE 1 TABLET BY MOUTH ONCE DAILY, Disp: 90 Tab, Rfl: 1    PARoxetine (PAXIL) 20 mg tablet, TAKE 3 TABLETS BY MOUTH ONCE DAILY, Disp: 270 Tab, Rfl: 1    metFORMIN ER (GLUCOPHAGE XR) 500 mg tablet, Take 1 Tab by mouth daily (with dinner). , Disp: 30 Tab, Rfl: 5    atorvastatin (LIPITOR) 20 mg tablet, Take 1 Tab by mouth daily. , Disp: 90 Tab, Rfl: 1    cholecalciferol (VITAMIN D3) 1,000 unit tablet, Take 1,000 Units by mouth daily. , Disp: , Rfl:     aspirin delayed-release 81 mg tablet, Take 1 Tab by mouth daily. , Disp: 30 Tab, Rfl: 11  Review of Systems   Constitutional: Negative. Respiratory: Negative. Cardiovascular: Negative. Gastrointestinal: Negative. Blood pressure 130/88, pulse 78, temperature 98.6 °F (37 °C), resp. rate 16, height 5' 5\" (1.651 m), weight 206 lb (93.4 kg), SpO2 97 %. Body mass index is 34.28 kg/m². Physical Exam  Constitutional:       Appearance: Normal appearance. He is obese. HENT:      Mouth/Throat:      Mouth: Mucous membranes are moist.      Pharynx: Oropharynx is clear. Neck:      Musculoskeletal: Normal range of motion and neck supple.    Cardiovascular: Rate and Rhythm: Normal rate and regular rhythm. Pulses: Normal pulses. Heart sounds: Normal heart sounds. No murmur. Pulmonary:      Effort: Pulmonary effort is normal.      Breath sounds: Normal breath sounds. Abdominal:      General: Bowel sounds are normal.      Palpations: Abdomen is soft. Neurological:      Mental Status: He is alert. ASSESSMENT and PLAN  Diagnoses and all orders for this visit:    1. Hyperlipidemia, unspecified hyperlipidemia type  -     LIPID PANEL    2. HTN (hypertension), benign  -     METABOLIC PANEL, COMPREHENSIVE  -     CBC W/O DIFF    3. Type 2 diabetes mellitus with hyperglycemia, without long-term current use of insulin (HCC)  -     HEMOGLOBIN A1C WITH EAG    4. Encounter for immunization  -     TETANUS, DIPHTHERIA TOXOIDS AND ACELLULAR PERTUSSIS VACCINE (TDAP), IN INDIVIDS. >=7, IM    5.  BMI 34.0-34.9,adult      Diet and exercise  Follow up in March for general medical exam  Pt was given an after visit summary which includes diagnosis, current medicines and vital and voiced understanding of treatment plan

## 2019-12-31 NOTE — PROGRESS NOTES
Chu Delong is a 51 y.o. male  who presents for routine immunizations. he denies any symptoms , reactions or allergies that would exclude them from being immunized today. Risks and adverse reactions were discussed and the VIS was given to them. All questions were addressed. he was observed for 10 min post injection. There were no reactions observed.     Siria Pope LPN

## 2019-12-31 NOTE — PROGRESS NOTES
Chief Complaint   Patient presents with    Hypertension    Diabetes     fasting    1. Have you been to the ER, urgent care clinic since your last visit? Hospitalized since your last visit? No    2. Have you seen or consulted any other health care providers outside of the [address] [place] [address] since your last visit? Include any pap smears or colon screening.  No

## 2020-01-01 LAB
ALBUMIN SERPL-MCNC: 4.5 G/DL (ref 3.5–5.5)
ALBUMIN/GLOB SERPL: 1.8 {RATIO} (ref 1.2–2.2)
ALP SERPL-CCNC: 84 IU/L (ref 39–117)
ALT SERPL-CCNC: 27 IU/L (ref 0–44)
AST SERPL-CCNC: 25 IU/L (ref 0–40)
BILIRUB SERPL-MCNC: 0.5 MG/DL (ref 0–1.2)
BUN SERPL-MCNC: 20 MG/DL (ref 6–24)
BUN/CREAT SERPL: 17 (ref 9–20)
CALCIUM SERPL-MCNC: 9.4 MG/DL (ref 8.7–10.2)
CHLORIDE SERPL-SCNC: 103 MMOL/L (ref 96–106)
CHOLEST SERPL-MCNC: 167 MG/DL (ref 100–199)
CO2 SERPL-SCNC: 21 MMOL/L (ref 20–29)
CREAT SERPL-MCNC: 1.16 MG/DL (ref 0.76–1.27)
ERYTHROCYTE [DISTWIDTH] IN BLOOD BY AUTOMATED COUNT: 12.7 % (ref 12.3–15.4)
EST. AVERAGE GLUCOSE BLD GHB EST-MCNC: 134 MG/DL
GLOBULIN SER CALC-MCNC: 2.5 G/DL (ref 1.5–4.5)
GLUCOSE SERPL-MCNC: 129 MG/DL (ref 65–99)
HBA1C MFR BLD: 6.3 % (ref 4.8–5.6)
HCT VFR BLD AUTO: 40.6 % (ref 37.5–51)
HDLC SERPL-MCNC: 44 MG/DL
HGB BLD-MCNC: 13.9 G/DL (ref 13–17.7)
INTERPRETATION, 910389: NORMAL
LDLC SERPL CALC-MCNC: 107 MG/DL (ref 0–99)
MCH RBC QN AUTO: 29.2 PG (ref 26.6–33)
MCHC RBC AUTO-ENTMCNC: 34.2 G/DL (ref 31.5–35.7)
MCV RBC AUTO: 85 FL (ref 79–97)
PLATELET # BLD AUTO: 266 X10E3/UL (ref 150–450)
POTASSIUM SERPL-SCNC: 4.5 MMOL/L (ref 3.5–5.2)
PROT SERPL-MCNC: 7 G/DL (ref 6–8.5)
RBC # BLD AUTO: 4.76 X10E6/UL (ref 4.14–5.8)
SODIUM SERPL-SCNC: 141 MMOL/L (ref 134–144)
TRIGL SERPL-MCNC: 78 MG/DL (ref 0–149)
VLDLC SERPL CALC-MCNC: 16 MG/DL (ref 5–40)
WBC # BLD AUTO: 4.9 X10E3/UL (ref 3.4–10.8)

## 2020-01-17 DIAGNOSIS — E78.5 HYPERLIPIDEMIA, UNSPECIFIED HYPERLIPIDEMIA TYPE: ICD-10-CM

## 2020-01-17 RX ORDER — ATORVASTATIN CALCIUM 40 MG/1
TABLET, FILM COATED ORAL
Qty: 30 TAB | Refills: 0 | Status: SHIPPED | OUTPATIENT
Start: 2020-01-17 | End: 2020-05-04 | Stop reason: SDUPTHER

## 2020-04-27 DIAGNOSIS — F41.9 ANXIETY: ICD-10-CM

## 2020-04-27 DIAGNOSIS — I10 HTN (HYPERTENSION), BENIGN: ICD-10-CM

## 2020-04-27 RX ORDER — PAROXETINE HYDROCHLORIDE 20 MG/1
TABLET, FILM COATED ORAL
Qty: 270 TAB | Refills: 0 | Status: SHIPPED | OUTPATIENT
Start: 2020-04-27 | End: 2020-11-17

## 2020-04-27 RX ORDER — METOPROLOL SUCCINATE 100 MG/1
TABLET, EXTENDED RELEASE ORAL
Qty: 90 TAB | Refills: 0 | Status: SHIPPED | OUTPATIENT
Start: 2020-04-27 | End: 2020-10-27

## 2020-05-04 DIAGNOSIS — E78.5 HYPERLIPIDEMIA, UNSPECIFIED HYPERLIPIDEMIA TYPE: ICD-10-CM

## 2020-05-04 RX ORDER — ATORVASTATIN CALCIUM 20 MG/1
20 TABLET, FILM COATED ORAL DAILY
Qty: 90 TAB | Refills: 1 | Status: CANCELLED | OUTPATIENT
Start: 2020-05-04

## 2020-05-04 NOTE — TELEPHONE ENCOUNTER
Please review atorvastatin. There are 2 orders, one for 20mg and 40mg. 20mg from 1/4/19 which nurse chose and 40mg from 1/17/20. Unsure which is current. Last Visit: 12/31/19  NP [address]  Next Appointment: Not scheduled  Previous Refill Encounter(s):   Atorvastatin 40mg 1/17/20  #30  Metformin 9/25/19  30 + 5 refills        Requested Prescriptions     Pending Prescriptions Disp Refills    metFORMIN ER (GLUCOPHAGE XR) 500 mg tablet 90 Tab 1     Sig: Take 1 Tab by mouth daily (with dinner).  atorvastatin (LIPITOR) 40 mg tablet 90 Tab 1     Sig: Take 1 Tab by mouth daily.

## 2020-05-04 NOTE — TELEPHONE ENCOUNTER
.Patient is requesting a 90 day supply medication  . Requested Prescriptions     Pending Prescriptions Disp Refills    atorvastatin (LIPITOR) 20 mg tablet 90 Tab 1     Sig: Take 1 Tab by mouth daily.  metFORMIN ER (GLUCOPHAGE XR) 500 mg tablet 30 Tab 5     Sig: Take 1 Tab by mouth daily (with dinner).      Pharmacy verified  [facility]# [phone]

## 2020-05-05 NOTE — TELEPHONE ENCOUNTER
PCP: Lexi Benson NP    Last appt: 12/31/2019  No future appointments. Requested Prescriptions     Pending Prescriptions Disp Refills    metFORMIN ER (GLUCOPHAGE XR) 500 mg tablet 90 Tab 1     Sig: Take 1 Tab by mouth daily (with dinner).  atorvastatin (LIPITOR) 40 mg tablet 90 Tab 1     Sig: Take 1 Tab by mouth daily.

## 2020-05-07 RX ORDER — ATORVASTATIN CALCIUM 40 MG/1
40 TABLET, FILM COATED ORAL DAILY
Qty: 90 TAB | Refills: 1 | Status: SHIPPED | OUTPATIENT
Start: 2020-05-07 | End: 2021-10-06

## 2020-05-07 RX ORDER — METFORMIN HYDROCHLORIDE 500 MG/1
500 TABLET, EXTENDED RELEASE ORAL
Qty: 90 TAB | Refills: 1 | Status: SHIPPED | OUTPATIENT
Start: 2020-05-07 | End: 2021-04-13

## 2020-10-21 ENCOUNTER — OFFICE VISIT (OUTPATIENT)
Dept: FAMILY MEDICINE CLINIC | Age: 50
End: 2020-10-21
Payer: COMMERCIAL

## 2020-10-21 VITALS
HEIGHT: 65 IN | DIASTOLIC BLOOD PRESSURE: 84 MMHG | WEIGHT: 208 LBS | RESPIRATION RATE: 16 BRPM | HEART RATE: 91 BPM | OXYGEN SATURATION: 97 % | SYSTOLIC BLOOD PRESSURE: 120 MMHG | BODY MASS INDEX: 34.66 KG/M2 | TEMPERATURE: 98.3 F

## 2020-10-21 DIAGNOSIS — E11.65 TYPE 2 DIABETES MELLITUS WITH HYPERGLYCEMIA, WITHOUT LONG-TERM CURRENT USE OF INSULIN (HCC): ICD-10-CM

## 2020-10-21 DIAGNOSIS — F41.9 ANXIETY: ICD-10-CM

## 2020-10-21 DIAGNOSIS — Z23 ENCOUNTER FOR IMMUNIZATION: ICD-10-CM

## 2020-10-21 DIAGNOSIS — I10 HTN (HYPERTENSION), BENIGN: ICD-10-CM

## 2020-10-21 DIAGNOSIS — E78.5 HYPERLIPIDEMIA, UNSPECIFIED HYPERLIPIDEMIA TYPE: Primary | ICD-10-CM

## 2020-10-21 DIAGNOSIS — Z23 NEEDS FLU SHOT: ICD-10-CM

## 2020-10-21 PROCEDURE — 99214 OFFICE O/P EST MOD 30 MIN: CPT | Performed by: NURSE PRACTITIONER

## 2020-10-21 PROCEDURE — 90732 PPSV23 VACC 2 YRS+ SUBQ/IM: CPT | Performed by: NURSE PRACTITIONER

## 2020-10-21 PROCEDURE — 90686 IIV4 VACC NO PRSV 0.5 ML IM: CPT | Performed by: NURSE PRACTITIONER

## 2020-10-21 PROCEDURE — 90471 IMMUNIZATION ADMIN: CPT | Performed by: NURSE PRACTITIONER

## 2020-10-21 PROCEDURE — 90472 IMMUNIZATION ADMIN EACH ADD: CPT | Performed by: NURSE PRACTITIONER

## 2020-10-21 RX ORDER — DULOXETIN HYDROCHLORIDE 30 MG/1
30 CAPSULE, DELAYED RELEASE ORAL DAILY
Qty: 30 CAP | Refills: 0 | Status: SHIPPED | OUTPATIENT
Start: 2020-10-21 | End: 2020-11-17

## 2020-10-21 NOTE — PROGRESS NOTES
[facility] Note  Subjective:      Paige Billy is a 53 y.o. male who presents for following up on chronic problems, he has history of hypertension, hyperlipidemia, diabetes, obesity and anxiety. He stopped taking his metformin 2 months ago. He is reports that Paxil is not working well for him and requesting another medication. Due to pneumonia and flu vaccines    Past Medical History:   Diagnosis Date    Anxiety disorder     Cataracts, both eyes     Sleep apnea 11/2015     Past Surgical History:   Procedure Laterality Date    HX HEENT Bilateral 11/2017    cataracts in both eyes. Current Outpatient Medications   Medication Sig Dispense Refill    DULoxetine (CYMBALTA) 30 mg capsule Take 1 Cap by mouth daily. 30 Cap 0    PARoxetine (PAXIL) 20 mg tablet Take 3 tablets by mouth once daily 270 Tab 0    metoprolol succinate (TOPROL-XL) 100 mg tablet Take 1 tablet by mouth once daily 90 Tab 0    atorvastatin (LIPITOR) 20 mg tablet Take 1 Tab by mouth daily. 90 Tab 1    cholecalciferol (VITAMIN D3) 1,000 unit tablet Take 1,000 Units by mouth daily.  aspirin delayed-release 81 mg tablet Take 1 Tab by mouth daily. 30 Tab 11    metFORMIN ER (GLUCOPHAGE XR) 500 mg tablet Take 1 Tab by mouth daily (with dinner). 90 Tab 1    atorvastatin (LIPITOR) 40 mg tablet Take 1 Tab by mouth daily. 90 Tab 1     No Known Allergies    ROS:   Complete review of systems was reviewed with pertinent information listed in HPI. Review of Systems   Constitutional: Negative. HENT: Negative. Respiratory: Negative. Cardiovascular: Negative. Gastrointestinal: Negative. Psychiatric/Behavioral: The patient is nervous/anxious.         Objective:     Visit Vitals  /84 (BP 1 Location: Right arm, BP Patient Position: Sitting)   Pulse 91   Temp 98.3 °F (36.8 °C) (Oral)   Resp 16   Ht 5' 5\" (1.651 m)   Wt 208 lb (94.3 kg)   SpO2 97%   BMI 34.61 kg/m²       Vitals and Nurse Documentation reviewed. Physical Exam  Constitutional:       Appearance: Normal appearance. He is obese. HENT:      Mouth/Throat:      Mouth: Mucous membranes are moist.   Neck:      Musculoskeletal: Normal range of motion and neck supple. Cardiovascular:      Rate and Rhythm: Normal rate and regular rhythm. Pulses: Normal pulses. Heart sounds: Normal heart sounds. No murmur. Pulmonary:      Effort: Pulmonary effort is normal.      Breath sounds: Normal breath sounds. Abdominal:      General: Bowel sounds are normal.      Palpations: Abdomen is soft. Skin:     Comments: Normal foot exam   Neurological:      Mental Status: He is alert. Psychiatric:         Mood and Affect: Mood normal.         Thought Content: Thought content normal.         Assessment/Plan:     Diagnoses and all orders for this visit:    1. Hyperlipidemia, unspecified hyperlipidemia type  -     LIPID PANEL    2. HTN (hypertension), benign  -     METABOLIC PANEL, COMPREHENSIVE  -     CBC W/O DIFF    3. Type 2 diabetes mellitus with hyperglycemia, without long-term current use of insulin (HCC)  -     HEMOGLOBIN A1C WITH EAG  -     MICROALBUMIN, UR, RAND W/ MICROALB/CREAT RATIO    4. Anxiety  -     DULoxetine (CYMBALTA) 30 mg capsule; Take 1 Cap by mouth daily. -     Will take 1/2 Paxil x 1 week, then stop the Paxil and start Cymbalta  5.  Encounter for immunization  -     NC IMMUNIZ ADMIN,1 SINGLE/COMB VAC/TOXOID  -     PNEUMOCOCCAL POLYSACCHARIDE VACCINE, 23-VALENT, ADULT OR IMMUNOSUPPRESSED PT DOSE,    6. Needs flu shot  -     NC IMMUNIZ ADMIN,1 SINGLE/COMB VAC/TOXOID  -     INFLUENZA VIRUS VAC QUAD,SPLIT,PRESV FREE SYRINGE IM  Follow up in 3 months        Pt expressed understanding with the diagnosis and plan        Discussed expected course/resolution/complications of diagnosis in detail with patient.    Medication risks/benefits/costs/interactions/alternatives discussed with patient.    Pt was given an after visit summary which includes diagnoses, current medications & vitals.  Pt expressed understanding with the diagnosis and plan

## 2020-10-21 NOTE — PROGRESS NOTES
Chief Complaint   Patient presents with    Complete Physical    Medication Refill    Immunization/Injection     INFLUENZA AND PNEUMOCOCCAL 23      1. Have you been to the ER, urgent care clinic since your last visit? Hospitalized since your last visit? No    2. Have you seen or consulted any other health care providers outside of the [address] [place] [address] since your last visit? Include any pap smears or colon screening. Nellie Morales  is a 53 y.o.  male  who present for INFLUENZA AND PNEUMOCOCCAL 23  immunizations/injections. He/she denies any symptoms , reactions or allergies that would exclude them from being immunized today. Risks and adverse reactions were discussed and the VIS was given if applicable to them. All questions were addressed. He/She was observed for 10 min post injection. There were no reactions observed.     Thu Sahni LPN

## 2020-10-21 NOTE — PATIENT INSTRUCTIONS
Vaccine Information Statement Influenza (Flu) Vaccine (Inactivated or Recombinant): What You Need to Know Many Vaccine Information Statements are available in French and other languages. See www.immunize.org/vis Hojas de información sobre vacunas están disponibles en español y en muchos otros idiomas. Visite www.immunize.org/vis 1. Why get vaccinated? Influenza vaccine can prevent influenza (flu). Flu is a contagious disease that spreads around the [facility] every year, usually between October and May. Anyone can get the flu, but it is more dangerous for some people. Infants and young children, people 72years of age and older, pregnant women, and people with certain health conditions or a weakened immune system are at greatest risk of flu complications. Pneumonia, bronchitis, sinus infections and ear infections are examples of flu-related complications. If you have a medical condition, such as heart disease, cancer or diabetes, flu can make it worse. Flu can cause fever and chills, sore throat, muscle aches, fatigue, cough, headache, and runny or stuffy nose. Some people may have vomiting and diarrhea, though this is more common in children than adults. Each year thousands of people in the [facility] die from flu, and many more are hospitalized. Flu vaccine prevents millions of illnesses and flu-related visits to the doctor each year. 2. Influenza vaccines CDC recommends everyone 10months of age and older get vaccinated every flu season. Children 6 months through 6years of age may need 2 doses during a single flu season. Everyone else needs only 1 dose each flu season. It takes about 2 weeks for protection to develop after vaccination. There are many flu viruses, and they are always changing. Each year a new flu vaccine is made to protect against three or four viruses that are likely to cause disease in the upcoming flu season.  Even when the vaccine doesnt exactly match these viruses, it may still provide some protection. Influenza vaccine does not cause flu. Influenza vaccine may be given at the same time as other vaccines. 3. Talk with your health care provider Tell your vaccine provider if the person getting the vaccine: 
 Has had an allergic reaction after a previous dose of influenza vaccine, or has any severe, life-threatening allergies.  Has ever had Guillain-Barré Syndrome (also called GBS). In some cases, your health care provider may decide to postpone influenza vaccination to a future visit. People with minor illnesses, such as a cold, may be vaccinated. People who are moderately or severely ill should usually wait until they recover before getting influenza vaccine. Your health care provider can give you more information. 4. Risks of a reaction  Soreness, redness, and swelling where shot is given, fever, muscle aches, and headache can happen after influenza vaccine.  There may be a very small increased risk of Guillain-Barré Syndrome (GBS) after inactivated influenza vaccine (the flu shot). [employer] children who get the flu shot along with pneumococcal vaccine (PCV13), and/or DTaP vaccine at the same time might be slightly more likely to have a seizure caused by fever. Tell your health care provider if a child who is getting flu vaccine has ever had a seizure. People sometimes faint after medical procedures, including vaccination. Tell your provider if you feel dizzy or have vision changes or ringing in the ears. As with any medicine, there is a very remote chance of a vaccine causing a severe allergic reaction, other serious injury, or death. 5. What if there is a serious problem? An allergic reaction could occur after the vaccinated person leaves the clinic.  If you see signs of a severe allergic reaction (hives, swelling of the face and throat, difficulty breathing, a fast heartbeat, dizziness, or weakness), call 9-1-1 and get the person to the nearest hospital. 
 
For other signs that concern you, call your health care provider. Adverse reactions should be reported to the Vaccine Adverse Event Reporting System (VAERS). Your health care provider will usually file this report, or you can do it yourself. Visit the VAERS website at www.vaers. hhs.gov or call 4-965.654.4627. VAERS is only for reporting reactions, and VAERS staff do not give medical advice. 6. The National Vaccine Injury Compensation Program 
 
The [facility] Vaccine Injury Compensation Program (VICP) is a federal program that was created to compensate people who may have been injured by certain vaccines. Visit the VICP website at www.[facility].gov/vaccinecompensation or call 4-509.665.4288 to learn about the program and about filing a claim. There is a time limit to file a claim for compensation. 7. How can I learn more?  Ask your health care provider.  Call your local or state health department.  Contact the Centers for Disease Control and Prevention (CDC): 
- Call 4-778.962.6608 (6-652-AHX-INFO) or 
- Visit CDCs influenza website at www.cdc.gov/flu Vaccine Information Statement (Interim) Inactivated Influenza Vaccine 8/15/2019 
42 CHELSIE Diane [id number]-[id number] Department of [facility] and [employer] Centers for Disease Control and Prevention Office Use Only Vaccine Information Statement Pneumococcal Polysaccharide Vaccine (PPSV23): What You Need to Know Many Vaccine Information Statements are available in Belarusian and other languages. See www.immunize.org/vis Hojas de información sobre vacunas están disponibles en español y en muchos otros idiomas. Visite www.immunize.org/vis 1. Why get vaccinated? Pneumococcal polysaccharide vaccine (PPSV23) can prevent pneumococcal disease. Pneumococcal disease refers to any illness caused by pneumococcal bacteria.  These bacteria can cause many types of illnesses, including pneumonia, which is an infection of the lungs. Pneumococcal bacteria are one of the most common causes of pneumonia. Besides pneumonia, pneumococcal bacteria can also cause: 
 Ear infections  Sinus infections  Meningitis (infection of the tissue covering the brain and spinal cord)  Bacteremia (bloodstream infection) Anyone can get pneumococcal disease, but children under 3years of age, people with certain medical conditions, adults 72 years or older, and cigarette smokers are at the highest risk. Most pneumococcal infections are mild. However, some can result in long-term problems, such as brain damage or hearing loss. Meningitis, bacteremia, and pneumonia caused by pneumococcal disease can be fatal.  
 
2. PPSV23  
 
PPSV23 protects against 23 types of bacteria that cause pneumococcal disease. PPSV23 is recommended for:  All adults 72 years or older,  Anyone 2 years or older with certain medical conditions that can lead to an increased risk for pneumococcal disease. Most people need only one dose of PPSV23. A second dose of PPSV23, and another type of pneumococcal vaccine called PCV13, are recommended for certain high-risk groups. Your health care provider can give you more information. People 65 years or older should get a dose of PPSV23 even if they have already gotten one or more doses of the vaccine before they turned 72. 
 
3. Talk with your health care provider Tell your vaccine provider if the person getting the vaccine: 
 Has had an allergic reaction after a previous dose of PPSV23, or has any severe, life-threatening allergies. In some cases, your health care provider may decide to postpone PPSV23 vaccination to a future visit. People with minor illnesses, such as a cold, may be vaccinated. People who are moderately or severely ill should usually wait until they recover before getting PPSV23. Your health care provider can give you more information. 4. Risks of a vaccine reaction  Redness or pain where the shot is given, feeling tired, fever, or muscle aches can happen after PPSV23. People sometimes faint after medical procedures, including vaccination. Tell your provider if you feel dizzy or have vision changes or ringing in the ears. As with any medicine, there is a very remote chance of a vaccine causing a severe allergic reaction, other serious injury, or death. 5. What if there is a serious problem? An allergic reaction could occur after the vaccinated person leaves the clinic. If you see signs of a severe allergic reaction (hives, swelling of the face and throat, difficulty breathing, a fast heartbeat, dizziness, or weakness), call 9-1-1 and get the person to the nearest hospital. 
 
For other signs that concern you, call your health care provider. Adverse reactions should be reported to the Vaccine Adverse Event Reporting System (VAERS). Your health care provider will usually file this report, or you can do it yourself. Visit the VAERS website at www.vaers. [facility].gov or call 8-443.402.7602. VAERS is only for reporting reactions, and VAERS staff do not give medical advice. 6. How can I learn more?  Ask your health care provider.  Call your local or state health department.  Contact the Centers for Disease Control and Prevention (CDC): 
- Call 0-295.274.9028 (1-800-CDC-INFO) or 
- Visit CDCs website at www.cdc.gov/vaccines Vaccine Information Statement PPSV23  
10/30/2019 Department of [facility] and [employer] Centers for Disease Control and Prevention Office Use Only Vaccine Information Statement Influenza (Flu) Vaccine (Inactivated or Recombinant): What You Need to Know Many Vaccine Information Statements are available in Tristanian and other languages. See www.immunize.org/vis Hojas de información sobre vacunas están disponibles en español y en muchos otros idiomas. Visite www.immunize.org/vis 1. Why get vaccinated? Influenza vaccine can prevent influenza (flu). Flu is a contagious disease that spreads around the [place] every year, usually between October and May. Anyone can get the flu, but it is more dangerous for some people. Infants and young children, people 72years of age and older, pregnant women, and people with certain health conditions or a weakened immune system are at greatest risk of flu complications. Pneumonia, bronchitis, sinus infections and ear infections are examples of flu-related complications. If you have a medical condition, such as heart disease, cancer or diabetes, flu can make it worse. Flu can cause fever and chills, sore throat, muscle aches, fatigue, cough, headache, and runny or stuffy nose. Some people may have vomiting and diarrhea, though this is more common in children than adults. Each year thousands of people in the [facility] die from flu, and many more are hospitalized. Flu vaccine prevents millions of illnesses and flu-related visits to the doctor each year. 2. Influenza vaccines CDC recommends everyone 10months of age and older get vaccinated every flu season. Children 6 months through 6years of age may need 2 doses during a single flu season. Everyone else needs only 1 dose each flu season. It takes about 2 weeks for protection to develop after vaccination. There are many flu viruses, and they are always changing. Each year a new flu vaccine is made to protect against three or four viruses that are likely to cause disease in the upcoming flu season. Even when the vaccine doesnt exactly match these viruses, it may still provide some protection. Influenza vaccine does not cause flu. Influenza vaccine may be given at the same time as other vaccines. 3. Talk with your health care provider Tell your vaccine provider if the person getting the vaccine:  Has had an allergic reaction after a previous dose of influenza vaccine, or has any severe, life-threatening allergies.  Has ever had Guillain-Barré Syndrome (also called GBS). In some cases, your health care provider may decide to postpone influenza vaccination to a future visit. People with minor illnesses, such as a cold, may be vaccinated. People who are moderately or severely ill should usually wait until they recover before getting influenza vaccine. Your health care provider can give you more information. 4. Risks of a reaction  Soreness, redness, and swelling where shot is given, fever, muscle aches, and headache can happen after influenza vaccine.  There may be a very small increased risk of Guillain-Barré Syndrome (GBS) after inactivated influenza vaccine (the flu shot). [employer] children who get the flu shot along with pneumococcal vaccine (PCV13), and/or DTaP vaccine at the same time might be slightly more likely to have a seizure caused by fever. Tell your health care provider if a child who is getting flu vaccine has ever had a seizure. People sometimes faint after medical procedures, including vaccination. Tell your provider if you feel dizzy or have vision changes or ringing in the ears. As with any medicine, there is a very remote chance of a vaccine causing a severe allergic reaction, other serious injury, or death. 5. What if there is a serious problem? An allergic reaction could occur after the vaccinated person leaves the clinic. If you see signs of a severe allergic reaction (hives, swelling of the face and throat, difficulty breathing, a fast heartbeat, dizziness, or weakness), call 9-1-1 and get the person to the nearest hospital. 
 
For other signs that concern you, call your health care provider. Adverse reactions should be reported to the Vaccine Adverse Event Reporting System (VAERS).  Your health care provider will usually file this report, or you can do it yourself. Visit the VAERS website at www.vaers. hhs.gov or call 3-238.580.1167. VAERS is only for reporting reactions, and VAERS staff do not give medical advice. 6. The National Vaccine Injury Compensation Program 
 
The [facility] Vaccine Injury Compensation Program (VICP) is a federal program that was created to compensate people who may have been injured by certain vaccines. Visit the VICP website at www.[facility].gov/vaccinecompensation or call 6-842.773.6605 to learn about the program and about filing a claim. There is a time limit to file a claim for compensation. 7. How can I learn more?  Ask your health care provider.  Call your local or state health department.  Contact the Centers for Disease Control and Prevention (CDC): 
- Call 4-342.333.5412 (1-800-CDC-INFO) or 
- Visit CDCs influenza website at www.cdc.gov/flu Vaccine Information Statement (Interim) Inactivated Influenza Vaccine 8/15/2019 
42 CHELSIE Byers 496SN-06 Department of Health and [employer] Centers for Disease Control and Prevention Office Use Only

## 2020-10-24 LAB
ALBUMIN SERPL-MCNC: 4.4 G/DL (ref 4–5)
ALBUMIN/CREAT UR: 3 MG/G CREAT (ref 0–29)
ALBUMIN/GLOB SERPL: 1.5 {RATIO} (ref 1.2–2.2)
ALP SERPL-CCNC: 96 IU/L (ref 39–117)
ALT SERPL-CCNC: 29 IU/L (ref 0–44)
AST SERPL-CCNC: 26 IU/L (ref 0–40)
BILIRUB SERPL-MCNC: 0.3 MG/DL (ref 0–1.2)
BUN SERPL-MCNC: 16 MG/DL (ref 6–24)
BUN/CREAT SERPL: 15 (ref 9–20)
CALCIUM SERPL-MCNC: 9.4 MG/DL (ref 8.7–10.2)
CHLORIDE SERPL-SCNC: 102 MMOL/L (ref 96–106)
CHOLEST SERPL-MCNC: 164 MG/DL (ref 100–199)
CO2 SERPL-SCNC: 23 MMOL/L (ref 20–29)
CREAT SERPL-MCNC: 1.04 MG/DL (ref 0.76–1.27)
CREAT UR-MCNC: 244.9 MG/DL
ERYTHROCYTE [DISTWIDTH] IN BLOOD BY AUTOMATED COUNT: 13.1 % (ref 11.6–15.4)
EST. AVERAGE GLUCOSE BLD GHB EST-MCNC: 137 MG/DL
GLOBULIN SER CALC-MCNC: 2.9 G/DL (ref 1.5–4.5)
GLUCOSE SERPL-MCNC: 109 MG/DL (ref 65–99)
HBA1C MFR BLD: 6.4 % (ref 4.8–5.6)
HCT VFR BLD AUTO: 41.9 % (ref 37.5–51)
HDLC SERPL-MCNC: 49 MG/DL
HGB BLD-MCNC: 14.3 G/DL (ref 13–17.7)
INTERPRETATION, 910389: NORMAL
LDLC SERPL CALC-MCNC: 99 MG/DL (ref 0–99)
MCH RBC QN AUTO: 29.6 PG (ref 26.6–33)
MCHC RBC AUTO-ENTMCNC: 34.1 G/DL (ref 31.5–35.7)
MCV RBC AUTO: 87 FL (ref 79–97)
MICROALBUMIN UR-MCNC: 8.5 UG/ML
PLATELET # BLD AUTO: 239 X10E3/UL (ref 150–450)
POTASSIUM SERPL-SCNC: 4.5 MMOL/L (ref 3.5–5.2)
PROT SERPL-MCNC: 7.3 G/DL (ref 6–8.5)
RBC # BLD AUTO: 4.83 X10E6/UL (ref 4.14–5.8)
SODIUM SERPL-SCNC: 139 MMOL/L (ref 134–144)
TRIGL SERPL-MCNC: 86 MG/DL (ref 0–149)
VLDLC SERPL CALC-MCNC: 16 MG/DL (ref 5–40)
WBC # BLD AUTO: 5.7 X10E3/UL (ref 3.4–10.8)

## 2020-10-27 DIAGNOSIS — I10 HTN (HYPERTENSION), BENIGN: ICD-10-CM

## 2020-10-27 RX ORDER — METOPROLOL SUCCINATE 100 MG/1
TABLET, EXTENDED RELEASE ORAL
Qty: 90 TAB | Refills: 0 | Status: SHIPPED | OUTPATIENT
Start: 2020-10-27 | End: 2021-02-08 | Stop reason: SDUPTHER

## 2020-11-17 ENCOUNTER — VIRTUAL VISIT (OUTPATIENT)
Dept: FAMILY MEDICINE CLINIC | Age: 50
End: 2020-11-17
Payer: COMMERCIAL

## 2020-11-17 DIAGNOSIS — F41.9 ANXIETY: Primary | ICD-10-CM

## 2020-11-17 DIAGNOSIS — E66.9 OBESITY (BMI 30-39.9): ICD-10-CM

## 2020-11-17 PROCEDURE — 99213 OFFICE O/P EST LOW 20 MIN: CPT | Performed by: NURSE PRACTITIONER

## 2020-11-17 RX ORDER — DULOXETIN HYDROCHLORIDE 60 MG/1
60 CAPSULE, DELAYED RELEASE ORAL DAILY
Qty: 90 CAP | Refills: 2 | Status: SHIPPED | OUTPATIENT
Start: 2020-11-17 | End: 2021-09-03

## 2020-11-17 NOTE — PROGRESS NOTES
Jonathan Hernandez  53 y.o. male  1970  [address]  [id number]     [facility]       Encounter Date: 11/17/2020           Established Patient Visit Note: Sahara Edwards NP    Reason for Appointment:  Chief Complaint   Patient presents with    Medication Management     follow up       History of Present Illness:  History provided by patient    Jonathan Hernandez is a 53 y.o. male who presents today for anxiety, reports that cymbalta is working better than paxil but would like increase the dosage         Review of Systems  Review of Systems   Constitutional: Negative. HENT: Negative. Respiratory: Negative. Cardiovascular: Negative. Gastrointestinal: Negative. Psychiatric/Behavioral: Negative for depression, hallucinations, memory loss, substance abuse and suicidal ideas. The patient is nervous/anxious. The patient does not have insomnia. Allergies: Patient has no known allergies. Medications: (Updated to reflect final medication list after visit)    Current Outpatient Medications:     DULoxetine (CYMBALTA) 60 mg capsule, Take 1 Cap by mouth daily. , Disp: 90 Cap, Rfl: 2    metoprolol succinate (TOPROL-XL) 100 mg tablet, Take 1 tablet by mouth once daily, Disp: 90 Tab, Rfl: 0    metFORMIN ER (GLUCOPHAGE XR) 500 mg tablet, Take 1 Tab by mouth daily (with dinner). , Disp: 90 Tab, Rfl: 1    atorvastatin (LIPITOR) 20 mg tablet, Take 1 Tab by mouth daily. , Disp: 90 Tab, Rfl: 1    cholecalciferol (VITAMIN D3) 1,000 unit tablet, Take 1,000 Units by mouth daily. , Disp: , Rfl:     aspirin delayed-release 81 mg tablet, Take 1 Tab by mouth daily. , Disp: 30 Tab, Rfl: 11    atorvastatin (LIPITOR) 40 mg tablet, Take 1 Tab by mouth daily. , Disp: 90 Tab, Rfl: 1    History  Patient Care Team:  Clifford Tay NP as PCP - General (Family Medicine)  Clifford Tay NP as PCP [facility]    Past Medical History: he has a past medical history of Anxiety disorder, Cataracts, both eyes, and Sleep apnea (11/2015). Past Surgical History: he has a past surgical history that includes hx heent (Bilateral, 11/2017). Family Medical History: family history includes Diabetes in his father; Hypertension in his father; No Known Problems in his mother. Social History: he reports that he has never smoked. He has never used smokeless tobacco. He reports current alcohol use. He reports that he does not use drugs. No specialty comments available. Objective:   Vital Signs  Unable to obtain vital signs today as patient does not have equipment for this at home    Physical Exam  Constitutional:       Appearance: Normal appearance. He is obese. HENT:      Head: Normocephalic. Neck:      Musculoskeletal: Normal range of motion and neck supple. Neurological:      Mental Status: He is alert. Psychiatric:         Mood and Affect: Mood normal.         Thought Content: Thought content normal.         Assessment & Plan:    1. Anxiety    - DULoxetine (CYMBALTA) 60 mg capsule; Take 1 Cap by mouth daily. Dispense: 90 Cap; Refill: 2    2. Obesity (BMI 30-39. 9)            I was in the office while conducting this encounter. Consent:  He and/or his healthcare decision maker is aware that this patient-initiated Telehealth encounter is a billable service, with coverage as determined by his insurance carrier. He is aware that he may receive a bill and has provided verbal consent to proceed: Yes    This virtual visit was conducted via [employer]. Pursuant to the emergency declaration under the [facility], 1135 waiver authority and the [employer] Act, this Virtual  Visit was conducted to reduce the patient's risk of exposure to COVID-19 and provide continuity of care for an established patient.   Services were provided through a video synchronous discussion virtually to substitute for in-person clinic visit. Due to this being a TeleHealth evaluation, many elements of the physical examination are unable to be assessed. Total Time: minutes: 11-20 minutes. I have discussed the diagnosis with the patient and the intended plan as seen in the above orders. The patient has received an after-visit summary along with patient information handout. I have discussed medication side effects and warnings with the patient as well.     Disposition    Follow up in three months for fasting office visit and blood work    Fracisco Abebe NP

## 2021-02-08 DIAGNOSIS — I10 HTN (HYPERTENSION), BENIGN: ICD-10-CM

## 2021-02-08 RX ORDER — METOPROLOL SUCCINATE 100 MG/1
TABLET, EXTENDED RELEASE ORAL
Qty: 90 TAB | Refills: 0 | Status: SHIPPED | OUTPATIENT
Start: 2021-02-08 | End: 2021-05-18

## 2021-02-24 ENCOUNTER — VIRTUAL VISIT (OUTPATIENT)
Dept: FAMILY MEDICINE CLINIC | Age: 51
End: 2021-02-24
Payer: COMMERCIAL

## 2021-02-24 DIAGNOSIS — E11.65 TYPE 2 DIABETES MELLITUS WITH HYPERGLYCEMIA, WITHOUT LONG-TERM CURRENT USE OF INSULIN (HCC): ICD-10-CM

## 2021-02-24 DIAGNOSIS — I10 HTN (HYPERTENSION), BENIGN: Primary | ICD-10-CM

## 2021-02-24 DIAGNOSIS — E78.5 HYPERLIPIDEMIA, UNSPECIFIED HYPERLIPIDEMIA TYPE: ICD-10-CM

## 2021-02-24 PROCEDURE — 99213 OFFICE O/P EST LOW 20 MIN: CPT | Performed by: NURSE PRACTITIONER

## 2021-02-24 NOTE — PROGRESS NOTES
Nilesh Fernández  53 y.o. male  1970  [address]  [id number]     [facility]       Encounter Date: 2/24/2021           Established Patient Visit Note: Ray Bradford NP    Reason for Appointment:  Chief Complaint   Patient presents with    Diabetes     F/U    Cholesterol Problem       History of Present Illness:  History provided by patient    Nilesh Fernández is a 53 y.o. male who presents today for follow up on chronic problems, he has history of hyperlipidemia, diabetes and obesity. Taking medication as prescribed, no medication side effects reported. ,         Review of Systems  Review of Systems   Constitutional: Negative. HENT: Negative. Respiratory: Negative. Cardiovascular: Negative. Gastrointestinal: Negative. Genitourinary: Negative. Allergies: Patient has no known allergies. Medications: (Updated to reflect final medication list after visit)    Current Outpatient Medications:     metoprolol succinate (TOPROL-XL) 100 mg tablet, Take 1 tablet by mouth once daily, Disp: 90 Tab, Rfl: 0    DULoxetine (CYMBALTA) 60 mg capsule, Take 1 Cap by mouth daily. , Disp: 90 Cap, Rfl: 2    metFORMIN ER (GLUCOPHAGE XR) 500 mg tablet, Take 1 Tab by mouth daily (with dinner). , Disp: 90 Tab, Rfl: 1    atorvastatin (LIPITOR) 40 mg tablet, Take 1 Tab by mouth daily. , Disp: 90 Tab, Rfl: 1    cholecalciferol (VITAMIN D3) 1,000 unit tablet, Take 1,000 Units by mouth daily. , Disp: , Rfl:     aspirin delayed-release 81 mg tablet, Take 1 Tab by mouth daily. , Disp: 30 Tab, Rfl: 11    History  Patient Care Team:  Breanne Mcadams NP as PCP - General (Family Medicine)  Breanne Mcadams NP as PCP - [address] [place] [address] Dr Yuled Provider    Past Medical History: he has a past medical history of Anxiety disorder, Cataracts, both eyes, and Sleep apnea (11/2015).     Past Surgical History: he has a past surgical history that includes hx heent (Bilateral, 11/2017). Family Medical History: family history includes Diabetes in his father; Hypertension in his father; No Known Problems in his mother. Social History: he reports that he has never smoked. He has never used smokeless tobacco. He reports current alcohol use. He reports that he does not use drugs. No specialty comments available. Objective:   Vital Signs  Unable to obtain vital signs today as patient does not have equipment for this at home    Physical Exam  Constitutional:       Appearance: Normal appearance. He is obese. Neck:      Musculoskeletal: Normal range of motion and neck supple. Neurological:      Mental Status: He is alert. Psychiatric:         Mood and Affect: Mood normal.         Thought Content: Thought content normal.         Assessment & Plan:    1. Type 2 diabetes mellitus with hyperglycemia, without long-term current use of insulin (HCC)    - METABOLIC PANEL, COMPREHENSIVE; Future    - HEMOGLOBIN A1C WITH EAG; Future    2. Hyperlipidemia, unspecified hyperlipidemia type    - LIPID PANEL; Future    3. BMI 34.0-34.9,adult  Diet and exercise           I was in the office while conducting this encounter. Consent:  He and/or his healthcare decision maker is aware that this patient-initiated Telehealth encounter is a billable service, with coverage as determined by his insurance carrier. He is aware that he may receive a bill and has provided verbal consent to proceed: Yes    This virtual visit was conducted via [employer]. Pursuant to the emergency declaration under the [facility], 1135 waiver authority and the [employer] Act, this Virtual  Visit was conducted to reduce the patient's risk of exposure to COVID-19 and provide continuity of care for an established patient. Services were provided through a video synchronous discussion virtually to substitute for in-person clinic visit.   Due to this being a TeleHealth evaluation, many elements of the physical examination are unable to be assessed. Total Time: minutes: 21-30 minutes. I have discussed the diagnosis with the patient and the intended plan as seen in the above orders. The patient has received an after-visit summary along with patient information handout. I have discussed medication side effects and warnings with the patient as well.     Disposition        Jaqui Lee NP

## 2021-04-13 RX ORDER — METFORMIN HYDROCHLORIDE 500 MG/1
TABLET, EXTENDED RELEASE ORAL
Qty: 90 TAB | Refills: 0 | Status: SHIPPED | OUTPATIENT
Start: 2021-04-13 | End: 2021-07-30

## 2021-05-16 DIAGNOSIS — I10 HTN (HYPERTENSION), BENIGN: ICD-10-CM

## 2021-05-18 RX ORDER — METOPROLOL SUCCINATE 100 MG/1
TABLET, EXTENDED RELEASE ORAL
Qty: 90 TAB | Refills: 0 | Status: SHIPPED | OUTPATIENT
Start: 2021-05-18 | End: 2021-08-31

## 2021-06-15 ENCOUNTER — OFFICE VISIT (OUTPATIENT)
Dept: FAMILY MEDICINE CLINIC | Age: 51
End: 2021-06-15
Payer: COMMERCIAL

## 2021-06-15 VITALS
HEIGHT: 65 IN | OXYGEN SATURATION: 98 % | SYSTOLIC BLOOD PRESSURE: 117 MMHG | TEMPERATURE: 98.2 F | BODY MASS INDEX: 32.72 KG/M2 | RESPIRATION RATE: 16 BRPM | DIASTOLIC BLOOD PRESSURE: 79 MMHG | HEART RATE: 83 BPM | WEIGHT: 196.4 LBS

## 2021-06-15 DIAGNOSIS — E55.9 VITAMIN D DEFICIENCY: ICD-10-CM

## 2021-06-15 DIAGNOSIS — E78.5 HYPERLIPIDEMIA, UNSPECIFIED HYPERLIPIDEMIA TYPE: ICD-10-CM

## 2021-06-15 DIAGNOSIS — I10 HTN (HYPERTENSION), BENIGN: ICD-10-CM

## 2021-06-15 DIAGNOSIS — E11.65 TYPE 2 DIABETES MELLITUS WITH HYPERGLYCEMIA, WITHOUT LONG-TERM CURRENT USE OF INSULIN (HCC): ICD-10-CM

## 2021-06-15 DIAGNOSIS — Z01.818 PRE-OP EVALUATION: Primary | ICD-10-CM

## 2021-06-15 PROCEDURE — 99243 OFF/OP CNSLTJ NEW/EST LOW 30: CPT | Performed by: NURSE PRACTITIONER

## 2021-06-15 NOTE — PROGRESS NOTES
Chief Complaint   Patient presents with    Pre-op Exam     cataract surgery on right eye       1. Have you been to the ER, urgent care clinic since your last visit? Hospitalized since your last visit? No    2. Have you seen or consulted any other health care providers outside of the [address] [place] [address] since your last visit? Include any pap smears or colon screening.  No    3 most recent PHQ Screens 6/15/2021   Little interest or pleasure in doing things Not at all   Feeling down, depressed, irritable, or hopeless Not at all   Total Score PHQ 2 0

## 2021-06-15 NOTE — PROGRESS NOTES
Preoperative Evaluation    Date of Exam: 6/15/2021    Mandeep Dexter is a 53 y.o. male (:1970) who presents for preoperative evaluation. Latex Allergy: no    Problem List:     Patient Active Problem List    Diagnosis Date Noted    Type 2 diabetes mellitus with hyperglycemia, without long-term current use of insulin [facility] 2018    Obesity (BMI 30-39.9) 10/24/2017    Apnea 2017    CPAP (continuous positive airway pressure) dependence 2017    HTN (hypertension), benign 2017    Hyperlipidemia 2017    Anxiety 2017     Medical History:     Past Medical History:   Diagnosis Date    Anxiety disorder     Cataracts, both eyes     Sleep apnea 2015     Allergies:   No Known Allergies   Medications:     Current Outpatient Medications   Medication Sig    metoprolol succinate (TOPROL-XL) 100 mg tablet Take 1 tablet by mouth once daily    metFORMIN ER (GLUCOPHAGE XR) 500 mg tablet TAKE 1 TABLET BY MOUTH ONCE DAILY WITH SUPPER    DULoxetine (CYMBALTA) 60 mg capsule Take 1 Cap by mouth daily.  atorvastatin (LIPITOR) 40 mg tablet Take 1 Tab by mouth daily.  cholecalciferol (VITAMIN D3) 1,000 unit tablet Take 1,000 Units by mouth daily.  aspirin delayed-release 81 mg tablet Take 1 Tab by mouth daily. No current facility-administered medications for this visit. Surgical History:     Past Surgical History:   Procedure Laterality Date    HX HEENT Bilateral 2017    cataracts in both eyes. Social History:     Social History     Socioeconomic History    Marital status:      Spouse name: Not on file    Number of children: Not on file    Years of education: Not on file    Highest education level: Not on file   Tobacco Use    Smoking status: Never Smoker    Smokeless tobacco: Never Used   Vaping Use    Vaping Use: Never used   Substance and Sexual Activity    Alcohol use:  Yes     Alcohol/week: 0.0 standard drinks     Types: 1 - 2 Cans of beer per week     Comment: socially    Drug use: No    Sexual activity: Yes     Social Determinants of Health     Financial Resource Strain:     Difficulty of Paying Living Expenses:    Food Insecurity:     Worried About Running Out of Food in the Last Year:     [address] in the Last Year:    Transportation Needs:     Lack of Transportation (Medical):  Lack of Transportation (Non-Medical):    Physical Activity:     Days of Exercise per Week:     Minutes of Exercise per Session:    Stress:     Feeling of Stress :    Social Connections:     Frequency of Communication with Friends and Family:     Frequency of Social Gatherings with Friends and Family:     Attends Restorationism Services:     Active Member of Clubs or Organizations:     Attends Club or Organization Meetings:     Marital Status:        Anesthesia Complications: None  History of abnormal bleeding : None  History of Blood Transfusions: no  Health Care Directive or Living Will: no    Objective:     ROS:    Feeling well. No dyspnea or chest pain on exertion. No abdominal pain, change in bowel habits, black or bloody stools. No urinary tract or prostatic symptoms. No neurological complaints. OBJECTIVE:   The patient appears well, alert, oriented x 3, in no distress. Visit Vitals  /79 (BP 1 Location: Right arm, BP Patient Position: Sitting, BP Cuff Size: Adult)   Pulse 83   Temp 98.2 °F (36.8 °C) (Temporal)   Resp 16   Ht 5' 5\" (1.651 m)   Wt 196 lb 6.4 oz (89.1 kg)   SpO2 98%   BMI 32.68 kg/m²     ENT normal.  Neck supple. No adenopathy or thyromegaly. CARLIE. Lungs are clear, good air entry, no wheezes, rhonchi or rales. Cardiovascular:S1 and S2 normal, no murmurs, regular rate and rhythm. Abdomen is soft without tenderness, guarding, mass or organomegaly.  exam: no penile lesions or discharge, no testicular masses or tenderness, no hernias. Extremities show no edema, normal peripheral pulses.    Neurological is normal without focal findings. DIAGNOSTICS:   1. EKG: was not done  2. CXR: was not done  3.  Labs: pending    IMPRESSION:   Low risk for planned surgery  No contraindications to planned surgery    Bonnie Khalil NP   6/15/2021

## 2021-06-16 LAB
25(OH)D3 SERPL-MCNC: 53.8 NG/ML (ref 30–100)
ALBUMIN SERPL-MCNC: 4.4 G/DL (ref 3.5–5)
ALBUMIN/GLOB SERPL: 1.4 {RATIO} (ref 1.1–2.2)
ALP SERPL-CCNC: 94 U/L (ref 45–117)
ALT SERPL-CCNC: 30 U/L (ref 12–78)
ANION GAP SERPL CALC-SCNC: 6 MMOL/L (ref 5–15)
AST SERPL-CCNC: 24 U/L (ref 15–37)
BILIRUB SERPL-MCNC: 0.5 MG/DL (ref 0.2–1)
BUN SERPL-MCNC: 18 MG/DL (ref 6–20)
BUN/CREAT SERPL: 16 (ref 12–20)
CALCIUM SERPL-MCNC: 10.2 MG/DL (ref 8.5–10.1)
CHLORIDE SERPL-SCNC: 104 MMOL/L (ref 97–108)
CHOLEST SERPL-MCNC: 166 MG/DL
CO2 SERPL-SCNC: 28 MMOL/L (ref 21–32)
CREAT SERPL-MCNC: 1.15 MG/DL (ref 0.7–1.3)
ERYTHROCYTE [DISTWIDTH] IN BLOOD BY AUTOMATED COUNT: 12.7 % (ref 11.5–14.5)
EST. AVERAGE GLUCOSE BLD GHB EST-MCNC: 128 MG/DL
GLOBULIN SER CALC-MCNC: 3.2 G/DL (ref 2–4)
GLUCOSE SERPL-MCNC: 126 MG/DL (ref 65–100)
HBA1C MFR BLD: 6.1 % (ref 4–5.6)
HCT VFR BLD AUTO: 43.7 % (ref 36.6–50.3)
HDLC SERPL-MCNC: 56 MG/DL
HDLC SERPL: 3 {RATIO} (ref 0–5)
HGB BLD-MCNC: 14.2 G/DL (ref 12.1–17)
LDLC SERPL CALC-MCNC: 97.2 MG/DL (ref 0–100)
MCH RBC QN AUTO: 29.8 PG (ref 26–34)
MCHC RBC AUTO-ENTMCNC: 32.5 G/DL (ref 30–36.5)
MCV RBC AUTO: 91.6 FL (ref 80–99)
NRBC # BLD: 0 K/UL (ref 0–0.01)
NRBC BLD-RTO: 0 PER 100 WBC
PLATELET # BLD AUTO: 235 K/UL (ref 150–400)
PMV BLD AUTO: 10.2 FL (ref 8.9–12.9)
POTASSIUM SERPL-SCNC: 4.7 MMOL/L (ref 3.5–5.1)
PROT SERPL-MCNC: 7.6 G/DL (ref 6.4–8.2)
RBC # BLD AUTO: 4.77 M/UL (ref 4.1–5.7)
SODIUM SERPL-SCNC: 138 MMOL/L (ref 136–145)
TRIGL SERPL-MCNC: 64 MG/DL (ref ?–150)
VLDLC SERPL CALC-MCNC: 12.8 MG/DL
WBC # BLD AUTO: 5.2 K/UL (ref 4.1–11.1)

## 2021-07-30 RX ORDER — METFORMIN HYDROCHLORIDE 500 MG/1
TABLET, EXTENDED RELEASE ORAL
Qty: 90 TABLET | Refills: 0 | Status: SHIPPED | OUTPATIENT
Start: 2021-07-30 | End: 2021-11-06

## 2021-08-31 DIAGNOSIS — I10 HTN (HYPERTENSION), BENIGN: ICD-10-CM

## 2021-08-31 RX ORDER — METOPROLOL SUCCINATE 100 MG/1
TABLET, EXTENDED RELEASE ORAL
Qty: 90 TABLET | Refills: 0 | Status: SHIPPED | OUTPATIENT
Start: 2021-08-31 | End: 2021-12-03

## 2021-09-02 DIAGNOSIS — F41.9 ANXIETY: ICD-10-CM

## 2021-09-03 RX ORDER — DULOXETIN HYDROCHLORIDE 60 MG/1
CAPSULE, DELAYED RELEASE ORAL
Qty: 90 CAPSULE | Refills: 0 | Status: SHIPPED | OUTPATIENT
Start: 2021-09-03 | End: 2021-12-15

## 2021-10-05 DIAGNOSIS — E78.5 HYPERLIPIDEMIA, UNSPECIFIED HYPERLIPIDEMIA TYPE: ICD-10-CM

## 2021-10-06 RX ORDER — ATORVASTATIN CALCIUM 40 MG/1
TABLET, FILM COATED ORAL
Qty: 90 TABLET | Refills: 0 | Status: SHIPPED | OUTPATIENT
Start: 2021-10-06 | End: 2022-05-03

## 2021-10-20 ENCOUNTER — OFFICE VISIT (OUTPATIENT)
Dept: FAMILY MEDICINE CLINIC | Age: 51
End: 2021-10-20
Payer: COMMERCIAL

## 2021-10-20 VITALS
HEIGHT: 65 IN | TEMPERATURE: 98.6 F | SYSTOLIC BLOOD PRESSURE: 138 MMHG | OXYGEN SATURATION: 97 % | RESPIRATION RATE: 16 BRPM | HEART RATE: 95 BPM | BODY MASS INDEX: 33.05 KG/M2 | WEIGHT: 198.4 LBS | DIASTOLIC BLOOD PRESSURE: 82 MMHG

## 2021-10-20 DIAGNOSIS — E78.5 HYPERLIPIDEMIA, UNSPECIFIED HYPERLIPIDEMIA TYPE: ICD-10-CM

## 2021-10-20 DIAGNOSIS — Z01.818 PRE-OP EVALUATION: Primary | ICD-10-CM

## 2021-10-20 DIAGNOSIS — E11.65 TYPE 2 DIABETES MELLITUS WITH HYPERGLYCEMIA, WITHOUT LONG-TERM CURRENT USE OF INSULIN (HCC): ICD-10-CM

## 2021-10-20 DIAGNOSIS — F41.9 ANXIETY: ICD-10-CM

## 2021-10-20 DIAGNOSIS — I10 HTN (HYPERTENSION), BENIGN: ICD-10-CM

## 2021-10-20 LAB
COMMENT, HOLDF: NORMAL
SAMPLES BEING HELD,HOLD: NORMAL

## 2021-10-20 PROCEDURE — 99243 OFF/OP CNSLTJ NEW/EST LOW 30: CPT | Performed by: NURSE PRACTITIONER

## 2021-10-20 RX ORDER — LOTEPREDNOL ETABONATE 3.8 MG/G
GEL OPHTHALMIC
COMMUNITY
Start: 2021-09-30 | End: 2022-09-20

## 2021-10-20 RX ORDER — BRIMONIDINE TARTRATE 2 MG/ML
SOLUTION/ DROPS OPHTHALMIC
COMMUNITY
Start: 2021-10-10 | End: 2022-03-22

## 2021-10-20 RX ORDER — DORZOLAMIDE HYDROCHLORIDE AND TIMOLOL MALEATE 20; 5 MG/ML; MG/ML
SOLUTION/ DROPS OPHTHALMIC
COMMUNITY
Start: 2021-09-01 | End: 2022-03-22

## 2021-10-20 RX ORDER — LIDOCAINE HYDROCHLORIDE 10 MG/ML
INJECTION INFILTRATION; PERINEURAL
COMMUNITY
Start: 2021-10-16 | End: 2022-03-22

## 2021-10-20 NOTE — PROGRESS NOTES
Preoperative Evaluation    Date of Exam: 10/20/2021    Raghu Castro is a 49 y.o. male (:1970) who presents for preoperative evaluation. Latex Allergy: no    Problem List:     Patient Active Problem List    Diagnosis Date Noted    Type 2 diabetes mellitus with hyperglycemia, without long-term current use of insulin ([facility] 2018    Obesity (BMI 30-39.9) 10/24/2017    Apnea 2017    CPAP (continuous positive airway pressure) dependence 2017    HTN (hypertension), benign 2017    Hyperlipidemia 2017    Anxiety 2017     Medical History:     Past Medical History:   Diagnosis Date    Anxiety disorder     Cataracts, both eyes     Sleep apnea 2015     Allergies:   No Known Allergies   Medications:     Current Outpatient Medications   Medication Sig    atorvastatin (LIPITOR) 40 mg tablet Take 1 tablet by mouth once daily    DULoxetine (CYMBALTA) 60 mg capsule Take 1 capsule by mouth once daily    metoprolol succinate (TOPROL-XL) 100 mg tablet Take 1 tablet by mouth once daily    metFORMIN ER (GLUCOPHAGE XR) 500 mg tablet TAKE 1 TABLET BY MOUTH ONCE DAILY WITH SUPPER    cholecalciferol (VITAMIN D3) 1,000 unit tablet Take 1,000 Units by mouth daily.  aspirin delayed-release 81 mg tablet Take 1 Tab by mouth daily.  lidocaine (XYLOCAINE) 10 mg/mL (1 %) injection THIS MEDICINE WAS COMPOUNDED IN OUR PHARMACY FOR USE BY A LICENSED PRACTITIONER ONLY. THIS COMPOUNDED PREPARATION MAY NOT BE RESOLD.  brimonidine (ALPHAGAN) 0.2 % ophthalmic solution     dorzolamide-timoloL (COSOPT) 22.3-6.8 mg/mL ophthalmic solution     Lotemax SM 0.38 % drpg PLACE 1 DROP INTO RIGHT EYE THREE TIMES DAILY     No current facility-administered medications for this visit. Surgical History:     Past Surgical History:   Procedure Laterality Date    HX HEENT Bilateral 2017    cataracts in both eyes.      Social History:     Social History     Socioeconomic History    Marital status:      Spouse name: Not on file    Number of children: Not on file    Years of education: Not on file    Highest education level: Not on file   Tobacco Use    Smoking status: Never Smoker    Smokeless tobacco: Never Used   Vaping Use    Vaping Use: Never used   Substance and Sexual Activity    Alcohol use: Yes     Alcohol/week: 0.0 standard drinks     Types: 1 - 2 Cans of beer per week     Comment: socially    Drug use: No    Sexual activity: Yes     Social Determinants of Health     Financial Resource Strain:     Difficulty of Paying Living Expenses:    Food Insecurity:     Worried About Running Out of Food in the Last Year:     [address] in the Last Year:    Transportation Needs:     Lack of Transportation (Medical):  Lack of Transportation (Non-Medical):    Physical Activity:     Days of Exercise per Week:     Minutes of Exercise per Session:    Stress:     Feeling of Stress :    Social Connections:     Frequency of Communication with Friends and Family:     Frequency of Social Gatherings with Friends and Family:     Attends Lutheran Services:     Active Member of Clubs or Organizations:     Attends Club or Organization Meetings:     Marital Status:        Anesthesia Complications: None  History of abnormal bleeding : None  History of Blood Transfusions: no  Health Care Directive or Living Will: no    Objective:     ROS:    Feeling well. No dyspnea or chest pain on exertion. No abdominal pain, change in bowel habits, black or bloody stools. No urinary tract or prostatic symptoms. No neurological complaints. OBJECTIVE:   The patient appears well, alert, oriented x 3, in no distress. Visit Vitals  /82 (BP 1 Location: Left upper arm, BP Patient Position: Sitting, BP Cuff Size: Adult)   Pulse 95   Temp 98.6 °F (37 °C) (Temporal)   Resp 16   Ht 5' 5\" (1.651 m)   Wt 198 lb 6.4 oz (90 kg)   SpO2 97%   BMI 33.02 kg/m²     ENT normal.  Neck supple.  No adenopathy or thyromegaly. CARLIE. Lungs are clear, good air entry, no wheezes, rhonchi or rales. Cardiovascular:S1 and S2 normal, no murmurs, regular rate and rhythm. Abdomen is soft without tenderness, guarding, mass or organomegaly.  exam: no penile lesions or discharge, no testicular masses or tenderness, no hernias. Extremities show no edema, normal peripheral pulses. Neurological is normal without focal findings. DIAGNOSTICS:   1. EKG: EKG FINDINGS - not done  2. CXR: not done  3. Labs:   Lab Results   Component Value Date/Time    WBC 5.2 06/15/2021 11:31 AM    HGB 14.2 06/15/2021 11:31 AM    HCT 43.7 06/15/2021 11:31 AM    PLATELET 640 49/81/1847 11:31 AM    MCV 91.6 06/15/2021 11:31 AM     Lab Results   Component Value Date/Time    Hemoglobin A1c 6.1 (H) 06/15/2021 11:31 AM    Hemoglobin A1c 6.4 (H) 10/23/2020 12:00 AM    Hemoglobin A1c 6.3 (H) 12/31/2019 11:55 AM    Glucose 126 (H) 06/15/2021 11:31 AM    Microalb/Creat ratio (ug/mg creat.) 3 10/23/2020 12:00 AM    LDL, calculated 97.2 06/15/2021 11:31 AM    Creatinine 1.15 06/15/2021 11:31 AM      Lab Results   Component Value Date/Time    Sodium 138 06/15/2021 11:31 AM    Potassium 4.7 06/15/2021 11:31 AM    Chloride 104 06/15/2021 11:31 AM    CO2 28 06/15/2021 11:31 AM    Anion gap 6 06/15/2021 11:31 AM    Glucose 126 (H) 06/15/2021 11:31 AM    BUN 18 06/15/2021 11:31 AM    Creatinine 1.15 06/15/2021 11:31 AM    BUN/Creatinine ratio 16 06/15/2021 11:31 AM    GFR est AA >60 06/15/2021 11:31 AM    GFR est non-AA >60 06/15/2021 11:31 AM    Calcium 10.2 (H) 06/15/2021 11:31 AM    Bilirubin, total 0.5 06/15/2021 11:31 AM    ALT (SGPT) 30 06/15/2021 11:31 AM    Alk.  phosphatase 94 06/15/2021 11:31 AM    Protein, total 7.6 06/15/2021 11:31 AM    Albumin 4.4 06/15/2021 11:31 AM    Globulin 3.2 06/15/2021 11:31 AM    A-G Ratio 1.4 06/15/2021 11:31 AM        IMPRESSION:   Low risk for planned surgery  No contraindications to planned surgery    Liu LOUIS Garcia   10/20/2021

## 2021-10-20 NOTE — PROGRESS NOTES
Chief Complaint   Patient presents with    Pre-op Exam     glaucoma xen procedure        1. \"Have you been to the ER, urgent care clinic since your last visit? Hospitalized since your last visit? \" No    2. \"Have you seen or consulted any other health care providers outside of the [address] [place] [address] since your last visit? \" No     3. For patients over 45: Has the patient had a colonoscopy? No     If the patient is female:    4. For patients over 40: Has the patient had a mammogram?  n/a  5.  For patients over 21: Has the patient had a pap smear? n/a    3 most recent PHQ Screens 10/20/2021   Little interest or pleasure in doing things Not at all   Feeling down, depressed, irritable, or hopeless Not at all   Total Score PHQ 2 0

## 2021-10-21 LAB
ALBUMIN SERPL-MCNC: 3.8 G/DL (ref 3.5–5)
ALBUMIN/GLOB SERPL: 1.1 {RATIO} (ref 1.1–2.2)
ALP SERPL-CCNC: 86 U/L (ref 45–117)
ALT SERPL-CCNC: 34 U/L (ref 12–78)
ANION GAP SERPL CALC-SCNC: 8 MMOL/L (ref 5–15)
AST SERPL-CCNC: 22 U/L (ref 15–37)
BILIRUB SERPL-MCNC: 0.3 MG/DL (ref 0.2–1)
BUN SERPL-MCNC: 18 MG/DL (ref 6–20)
BUN/CREAT SERPL: 17 (ref 12–20)
CALCIUM SERPL-MCNC: 9.4 MG/DL (ref 8.5–10.1)
CHLORIDE SERPL-SCNC: 106 MMOL/L (ref 97–108)
CHOLEST SERPL-MCNC: 198 MG/DL
CO2 SERPL-SCNC: 25 MMOL/L (ref 21–32)
CREAT SERPL-MCNC: 1.07 MG/DL (ref 0.7–1.3)
ERYTHROCYTE [DISTWIDTH] IN BLOOD BY AUTOMATED COUNT: 12.2 % (ref 11.5–14.5)
EST. AVERAGE GLUCOSE BLD GHB EST-MCNC: 128 MG/DL
GLOBULIN SER CALC-MCNC: 3.6 G/DL (ref 2–4)
GLUCOSE SERPL-MCNC: 133 MG/DL (ref 65–100)
HBA1C MFR BLD: 6.1 % (ref 4–5.6)
HCT VFR BLD AUTO: 42.4 % (ref 36.6–50.3)
HDLC SERPL-MCNC: 46 MG/DL
HDLC SERPL: 4.3 {RATIO} (ref 0–5)
HGB BLD-MCNC: 14.1 G/DL (ref 12.1–17)
LDLC SERPL CALC-MCNC: 89.8 MG/DL (ref 0–100)
MCH RBC QN AUTO: 30.1 PG (ref 26–34)
MCHC RBC AUTO-ENTMCNC: 33.3 G/DL (ref 30–36.5)
MCV RBC AUTO: 90.4 FL (ref 80–99)
NRBC # BLD: 0 K/UL (ref 0–0.01)
NRBC BLD-RTO: 0 PER 100 WBC
PLATELET # BLD AUTO: 246 K/UL (ref 150–400)
PMV BLD AUTO: 10.3 FL (ref 8.9–12.9)
POTASSIUM SERPL-SCNC: 4 MMOL/L (ref 3.5–5.1)
PROT SERPL-MCNC: 7.4 G/DL (ref 6.4–8.2)
RBC # BLD AUTO: 4.69 M/UL (ref 4.1–5.7)
SODIUM SERPL-SCNC: 139 MMOL/L (ref 136–145)
TRIGL SERPL-MCNC: 311 MG/DL (ref ?–150)
VLDLC SERPL CALC-MCNC: 62.2 MG/DL
WBC # BLD AUTO: 6 K/UL (ref 4.1–11.1)

## 2021-10-24 ENCOUNTER — HOSPITAL ENCOUNTER (EMERGENCY)
Age: 51
Discharge: HOME | End: 2021-10-24
Attending: EMERGENCY MEDICINE
Payer: COMMERCIAL

## 2021-10-24 VITALS
RESPIRATION RATE: 16 BRPM | BODY MASS INDEX: 31.34 KG/M2 | HEIGHT: 66 IN | OXYGEN SATURATION: 97 % | TEMPERATURE: 98.3 F | DIASTOLIC BLOOD PRESSURE: 93 MMHG | HEART RATE: 109 BPM | WEIGHT: 195 LBS | SYSTOLIC BLOOD PRESSURE: 135 MMHG

## 2021-10-24 DIAGNOSIS — M79.89 FOOT SWELLING: Primary | ICD-10-CM

## 2021-10-24 DIAGNOSIS — I83.899 RUPTURED VARICOSE VEIN: ICD-10-CM

## 2021-10-24 DIAGNOSIS — R03.0 ELEVATED BLOOD PRESSURE READING: ICD-10-CM

## 2021-10-24 PROCEDURE — 99283 EMERGENCY DEPT VISIT LOW MDM: CPT

## 2021-10-24 RX ORDER — SULFAMETHOXAZOLE AND TRIMETHOPRIM 800; 160 MG/1; MG/1
1 TABLET ORAL 2 TIMES DAILY
Qty: 14 TABLET | Refills: 0 | Status: SHIPPED | OUTPATIENT
Start: 2021-10-24 | End: 2021-10-31

## 2021-10-25 NOTE — ED TRIAGE NOTES
Triage note: right foot swollen, blood blister size  Of a nickel  bottom of foot, with a larger red bravo around blister.  All started at 1700

## 2021-10-25 NOTE — ED NOTES
I have reviewed discharge instructions with the patient. The patient verbalized understanding.   Ambulated from ed without incident, remained alert and oriented throughout ed stay

## 2021-10-25 NOTE — ED PROVIDER NOTES
79-year-old male with history of anxiety presents with complaints of right foot swelling and itchiness after noticing a sharp pinpoint pain this afternoon which resolved immediately. Then patient noticed later in the evening his right foot was swollen and a blood blister on the central plantar surface. Denies any trauma. Denies any pain. Denies numbness, tingling, weakness. Denies fever, chills, nausea, vomiting, chest pain, shortness of breath. Denies tobacco use, drug use  Occasional alcohol use  [facility]           Past Medical History:   Diagnosis Date    Anxiety disorder     Cataracts, both eyes     Sleep apnea 11/2015       Past Surgical History:   Procedure Laterality Date    HX HEENT Bilateral 11/2017    cataracts in both eyes. Family History:   Problem Relation Age of Onset    No Known Problems Mother     Hypertension Father     Diabetes Father        Social History     Socioeconomic History    Marital status:      Spouse name: Not on file    Number of children: Not on file    Years of education: Not on file    Highest education level: Not on file   Occupational History    Not on file   Tobacco Use    Smoking status: Never Smoker    Smokeless tobacco: Never Used   Vaping Use    Vaping Use: Never used   Substance and Sexual Activity    Alcohol use: Yes     Alcohol/week: 0.0 standard drinks     Types: 1 - 2 Cans of beer per week     Comment: socially    Drug use: No    Sexual activity: Yes   Other Topics Concern    Not on file   Social History Narrative    Not on file     Social Determinants of Health     Financial Resource Strain:     Difficulty of Paying Living Expenses:    Food Insecurity:     Worried About Running Out of Food in the Last Year:     [address] in the Last Year:    Transportation Needs:     Lack of Transportation (Medical):      Lack of Transportation (Non-Medical):    Physical Activity:     Days of Exercise per Week:     Minutes of Exercise per Session:    Stress:     Feeling of Stress :    Social Connections:     Frequency of Communication with Friends and Family:     Frequency of Social Gatherings with Friends and Family:     Attends Pentecostalism Services:     Active Member of Clubs or Organizations:     Attends Club or Organization Meetings:     Marital Status:    Intimate Partner Violence:     Fear of Current or Ex-Partner:     Emotionally Abused:     Physically Abused:     Sexually Abused: ALLERGIES: Patient has no known allergies. Review of Systems   Constitutional: Negative for chills and fever. Respiratory: Negative for cough and shortness of breath. Cardiovascular: Negative for chest pain. Gastrointestinal: Negative for abdominal pain, nausea and vomiting. Genitourinary: Negative for dysuria and urgency. Musculoskeletal: Positive for joint swelling. Skin: Positive for wound. Neurological: Negative for seizures and headaches. Vitals:    10/24/21 2136 10/24/21 2215   BP: (!) 168/118 (!) 135/93   Pulse: (!) 109    Resp: 16    Temp: 98.3 °F (36.8 °C)    SpO2: 99% 97%   Weight: 88.5 kg (195 lb)    Height: 5' 6\" (1.676 m)             Physical Exam  Vitals and nursing note reviewed. Constitutional:       Appearance: He is well-developed. HENT:      Head: Normocephalic and atraumatic. Eyes:      Pupils: Pupils are equal, round, and reactive to light. Cardiovascular:      Rate and Rhythm: Normal rate and regular rhythm. Heart sounds: Normal heart sounds. Pulmonary:      Effort: Pulmonary effort is normal. No respiratory distress. Breath sounds: Normal breath sounds. No wheezing. Abdominal:      General: Bowel sounds are normal.      Palpations: Abdomen is soft. Tenderness: There is no abdominal tenderness. There is no guarding or rebound. Musculoskeletal:         General: Normal range of motion. Cervical back: Normal range of motion and neck supple. Legs:       Comments: Dorsal right foot swelling, blood blister plantar aspect. With mild erythema surrounding blood blister. No tenderness to palpation or fluctuance. Skin:     General: Skin is warm and dry. Neurological:      Mental Status: He is alert and oriented to person, place, and time. Psychiatric:         Behavior: Behavior normal.          MDM  Number of Diagnoses or Management Options  Elevated blood pressure reading  Foot swelling  Ruptured varicose vein  Diagnosis management comments: 16-year-old male with no significant past medical history presents with right foot swelling, without trauma. Patient is well-appearing, no acute distress, afebrile, nontoxic. Right foot with mild swelling and blood blister on plantar aspect with mild erythema surrounding. Possible ruptured varicose vein versus early cellulitis. Provided with prescription for Bactrim and follow-up with primary care physician. Advised nonsteroidal anti-inflammatories. Patient expresses understanding and agreeable with plan. Blood pressure and heart rate much improved after anxiety relieved. Heart rate down to 94, blood pressure 135/93.     Patient Progress  Patient progress: improved         Procedures

## 2021-11-06 RX ORDER — METFORMIN HYDROCHLORIDE 500 MG/1
TABLET, EXTENDED RELEASE ORAL
Qty: 90 TABLET | Refills: 0 | Status: SHIPPED | OUTPATIENT
Start: 2021-11-06 | End: 2022-02-11

## 2021-12-03 DIAGNOSIS — I10 HTN (HYPERTENSION), BENIGN: ICD-10-CM

## 2021-12-03 RX ORDER — METOPROLOL SUCCINATE 100 MG/1
TABLET, EXTENDED RELEASE ORAL
Qty: 90 TABLET | Refills: 0 | Status: SHIPPED | OUTPATIENT
Start: 2021-12-03 | End: 2022-03-18

## 2021-12-15 DIAGNOSIS — F41.9 ANXIETY: ICD-10-CM

## 2021-12-15 RX ORDER — DULOXETIN HYDROCHLORIDE 60 MG/1
CAPSULE, DELAYED RELEASE ORAL
Qty: 90 CAPSULE | Refills: 0 | Status: SHIPPED | OUTPATIENT
Start: 2021-12-15 | End: 2022-03-22

## 2022-02-11 RX ORDER — METFORMIN HYDROCHLORIDE 500 MG/1
TABLET, EXTENDED RELEASE ORAL
Qty: 90 TABLET | Refills: 0 | Status: SHIPPED | OUTPATIENT
Start: 2022-02-11 | End: 2022-05-27

## 2022-03-18 DIAGNOSIS — I10 HTN (HYPERTENSION), BENIGN: ICD-10-CM

## 2022-03-18 PROBLEM — R06.81 APNEA: Status: ACTIVE | Noted: 2017-07-18

## 2022-03-18 PROBLEM — E78.5 HYPERLIPIDEMIA: Status: ACTIVE | Noted: 2017-01-25

## 2022-03-18 PROBLEM — F41.9 ANXIETY: Status: ACTIVE | Noted: 2017-01-25

## 2022-03-18 RX ORDER — METOPROLOL SUCCINATE 100 MG/1
TABLET, EXTENDED RELEASE ORAL
Qty: 90 TABLET | Refills: 0 | Status: SHIPPED | OUTPATIENT
Start: 2022-03-18 | End: 2022-06-26

## 2022-03-19 PROBLEM — E11.65 TYPE 2 DIABETES MELLITUS WITH HYPERGLYCEMIA, WITHOUT LONG-TERM CURRENT USE OF INSULIN (HCC): Status: ACTIVE | Noted: 2018-03-28

## 2022-03-19 PROBLEM — Z99.89 CPAP (CONTINUOUS POSITIVE AIRWAY PRESSURE) DEPENDENCE: Status: ACTIVE | Noted: 2017-07-18

## 2022-03-19 PROBLEM — E66.9 OBESITY (BMI 30-39.9): Status: ACTIVE | Noted: 2017-10-24

## 2022-03-20 PROBLEM — I10 HTN (HYPERTENSION), BENIGN: Status: ACTIVE | Noted: 2017-01-25

## 2022-03-22 DIAGNOSIS — F41.9 ANXIETY: ICD-10-CM

## 2022-03-22 RX ORDER — DULOXETIN HYDROCHLORIDE 60 MG/1
CAPSULE, DELAYED RELEASE ORAL
Qty: 90 CAPSULE | Refills: 0 | Status: SHIPPED | OUTPATIENT
Start: 2022-03-22 | End: 2022-03-23 | Stop reason: SDUPTHER

## 2022-03-23 ENCOUNTER — OFFICE VISIT (OUTPATIENT)
Dept: FAMILY MEDICINE CLINIC | Age: 52
End: 2022-03-23
Payer: COMMERCIAL

## 2022-03-23 VITALS
OXYGEN SATURATION: 100 % | TEMPERATURE: 97.8 F | DIASTOLIC BLOOD PRESSURE: 87 MMHG | HEIGHT: 66 IN | RESPIRATION RATE: 18 BRPM | BODY MASS INDEX: 30.7 KG/M2 | HEART RATE: 74 BPM | SYSTOLIC BLOOD PRESSURE: 122 MMHG | WEIGHT: 191 LBS

## 2022-03-23 DIAGNOSIS — Z12.11 SCREENING FOR COLON CANCER: ICD-10-CM

## 2022-03-23 DIAGNOSIS — Z11.59 NEED FOR HEPATITIS C SCREENING TEST: ICD-10-CM

## 2022-03-23 DIAGNOSIS — I10 HTN (HYPERTENSION), BENIGN: Primary | ICD-10-CM

## 2022-03-23 DIAGNOSIS — E78.5 HYPERLIPIDEMIA, UNSPECIFIED HYPERLIPIDEMIA TYPE: ICD-10-CM

## 2022-03-23 DIAGNOSIS — E11.65 TYPE 2 DIABETES MELLITUS WITH HYPERGLYCEMIA, WITHOUT LONG-TERM CURRENT USE OF INSULIN (HCC): ICD-10-CM

## 2022-03-23 DIAGNOSIS — F41.9 ANXIETY: ICD-10-CM

## 2022-03-23 LAB
ALBUMIN SERPL-MCNC: 4 G/DL (ref 3.5–5)
ALBUMIN/GLOB SERPL: 1.3 {RATIO} (ref 1.1–2.2)
ALP SERPL-CCNC: 81 U/L (ref 45–117)
ALT SERPL-CCNC: 28 U/L (ref 12–78)
ANION GAP SERPL CALC-SCNC: 4 MMOL/L (ref 5–15)
AST SERPL-CCNC: 18 U/L (ref 15–37)
BILIRUB SERPL-MCNC: 0.5 MG/DL (ref 0.2–1)
BUN SERPL-MCNC: 16 MG/DL (ref 6–20)
BUN/CREAT SERPL: 15 (ref 12–20)
CALCIUM SERPL-MCNC: 9.3 MG/DL (ref 8.5–10.1)
CHLORIDE SERPL-SCNC: 107 MMOL/L (ref 97–108)
CHOLEST SERPL-MCNC: 137 MG/DL
CO2 SERPL-SCNC: 27 MMOL/L (ref 21–32)
CREAT SERPL-MCNC: 1.04 MG/DL (ref 0.7–1.3)
CREAT UR-MCNC: 176 MG/DL
ERYTHROCYTE [DISTWIDTH] IN BLOOD BY AUTOMATED COUNT: 12 % (ref 11.5–14.5)
EST. AVERAGE GLUCOSE BLD GHB EST-MCNC: 126 MG/DL
GLOBULIN SER CALC-MCNC: 3.2 G/DL (ref 2–4)
GLUCOSE SERPL-MCNC: 120 MG/DL (ref 65–100)
HBA1C MFR BLD: 6 % (ref 4–5.6)
HCT VFR BLD AUTO: 42.2 % (ref 36.6–50.3)
HCV AB SERPL QL IA: NONREACTIVE
HDLC SERPL-MCNC: 51 MG/DL
HDLC SERPL: 2.7 {RATIO} (ref 0–5)
HGB BLD-MCNC: 13.8 G/DL (ref 12.1–17)
LDLC SERPL CALC-MCNC: 73.6 MG/DL (ref 0–100)
MCH RBC QN AUTO: 29.5 PG (ref 26–34)
MCHC RBC AUTO-ENTMCNC: 32.7 G/DL (ref 30–36.5)
MCV RBC AUTO: 90.2 FL (ref 80–99)
MICROALBUMIN UR-MCNC: 0.87 MG/DL
MICROALBUMIN/CREAT UR-RTO: 5 MG/G (ref 0–30)
NRBC # BLD: 0 K/UL (ref 0–0.01)
NRBC BLD-RTO: 0 PER 100 WBC
PLATELET # BLD AUTO: 218 K/UL (ref 150–400)
PMV BLD AUTO: 10.1 FL (ref 8.9–12.9)
POTASSIUM SERPL-SCNC: 4.2 MMOL/L (ref 3.5–5.1)
PROT SERPL-MCNC: 7.2 G/DL (ref 6.4–8.2)
RBC # BLD AUTO: 4.68 M/UL (ref 4.1–5.7)
SODIUM SERPL-SCNC: 138 MMOL/L (ref 136–145)
TRIGL SERPL-MCNC: 62 MG/DL (ref ?–150)
VLDLC SERPL CALC-MCNC: 12.4 MG/DL
WBC # BLD AUTO: 4.5 K/UL (ref 4.1–11.1)

## 2022-03-23 PROCEDURE — 99214 OFFICE O/P EST MOD 30 MIN: CPT | Performed by: NURSE PRACTITIONER

## 2022-03-23 RX ORDER — PREDNISOLONE ACETATE 10 MG/ML
SUSPENSION/ DROPS OPHTHALMIC
COMMUNITY
Start: 2022-02-23 | End: 2022-09-20

## 2022-03-23 RX ORDER — DULOXETIN HYDROCHLORIDE 60 MG/1
60 CAPSULE, DELAYED RELEASE ORAL DAILY
Qty: 90 CAPSULE | Refills: 1 | Status: SHIPPED | OUTPATIENT
Start: 2022-03-23 | End: 2022-07-14

## 2022-03-23 NOTE — PROGRESS NOTES
Identified pt with two pt identifiers(name and ). Reviewed record in preparation for visit and have obtained necessary documentation. All patient medications has been reviewed. Chief Complaint   Patient presents with    Diabetes    Hypertension       3 most recent PHQ Screens 3/23/2022   Little interest or pleasure in doing things Not at all   Feeling down, depressed, irritable, or hopeless Not at all   Total Score PHQ 2 0     Abuse Screening Questionnaire 10/21/2020   Do you ever feel afraid of your partner? N   Are you in a relationship with someone who physically or mentally threatens you? N   Is it safe for you to go home? Y       Health Maintenance Due   Topic    Hepatitis C Screening     COVID-19 Vaccine (1)    Colorectal Cancer Screening Combo     Shingrix Vaccine Age 50> (1 of 2)    MICROALBUMIN Q1      Health Maintenance Review: Patient reminded of \"due or due soon\" health maintenance. I have asked the patient to contact his/her primary care provider (PCP) for follow-up on his/her health maintenance. Vitals:    22 0833   BP: 122/87   Pulse: 74   Resp: 18   Temp: 97.8 °F (36.6 °C)   TempSrc: Temporal   SpO2: 100%   Weight: 101 lb 14.4 oz (46.2 kg)   Height: 5' 6\" (1.676 m)   PainSc:   0 - No pain       Wt Readings from Last 3 Encounters:   22 101 lb 14.4 oz (46.2 kg)   10/24/21 195 lb (88.5 kg)   10/20/21 198 lb 6.4 oz (90 kg)     Temp Readings from Last 3 Encounters:   22 97.8 °F (36.6 °C) (Temporal)   10/24/21 98.3 °F (36.8 °C)   10/20/21 98.6 °F (37 °C) (Temporal)     BP Readings from Last 3 Encounters:   22 122/87   10/24/21 (!) 135/93   10/20/21 138/82     Pulse Readings from Last 3 Encounters:   22 74   10/24/21 (!) 109   10/20/21 95       1. \"Have you been to the ER, urgent care clinic since your last visit? Hospitalized since your last visit? \" No    2.  \"Have you seen or consulted any other health care providers outside of the [address] [place] [address] since your last visit? \" No     3. For patients aged 39-70: Has the patient had a colonoscopy / FIT/ Cologuard?  NA - based on age

## 2022-03-23 NOTE — PROGRESS NOTES
[facility] Note  Subjective:      Silvia Waldrop is a 49 y.o. male who presents for follow up on chronic problems. He has history of hyperlipidemia, diabetes, hypertension and anxiety, taking medications as prescribed, no medication side effects reported. He is due for colonoscopy, would like to do it in the summer time. Past Medical History:   Diagnosis Date    Anxiety disorder     Cataracts, both eyes     Diabetes ([address]     Hypercholesterolemia     Hypertension     Sleep apnea 11/2015       Past Surgical History:   Procedure Laterality Date    HX HEENT Bilateral 11/2017    cataracts in both eyes. Current Outpatient Medications   Medication Sig Dispense Refill    prednisoLONE acetate (PRED FORTE) 1 % ophthalmic suspension INSTILL 1 DROP INTO RIGHT EYE THREE TIMES DAILY      DULoxetine (CYMBALTA) 60 mg capsule Take 1 capsule by mouth once daily 90 Capsule 0    metoprolol succinate (TOPROL-XL) 100 mg tablet Take 1 tablet by mouth once daily 90 Tablet 0    metFORMIN ER (GLUCOPHAGE XR) 500 mg tablet TAKE 1 TABLET BY MOUTH ONCE DAILY WITH SUPPER 90 Tablet 0    atorvastatin (LIPITOR) 40 mg tablet Take 1 tablet by mouth once daily 90 Tablet 0    cholecalciferol (VITAMIN D3) 1,000 unit tablet Take 1,000 Units by mouth daily.  aspirin delayed-release 81 mg tablet Take 1 Tab by mouth daily. 30 Tab 11    Lotemax SM 0.38 % drpg PLACE 1 DROP INTO RIGHT EYE THREE TIMES DAILY (Patient not taking: Reported on 10/24/2021)       No Known Allergies    ROS:   Complete review of systems was reviewed with pertinent information listed in HPI. Review of Systems   Constitutional: Negative. HENT: Negative. Respiratory: Negative. Cardiovascular: Negative. Gastrointestinal: Negative. Genitourinary: Negative. Musculoskeletal: Negative.         Objective:     Visit Vitals  /87 (BP 1 Location: Left upper arm, BP Patient Position: Sitting, BP Cuff Size: Adult)   Pulse 74   Temp 97.8 °F (36.6 °C) (Temporal)   Resp 18   Ht 5' 6\" (1.676 m)   Wt 191 lb 14.4 oz (46.2 kg)   SpO2 100%   BMI 16.45 kg/m²       Vitals and Nurse Documentation reviewed. Physical Exam  Constitutional:       Appearance: Normal appearance. HENT:      Mouth/Throat:      Mouth: Mucous membranes are moist.   Cardiovascular:      Rate and Rhythm: Normal rate and regular rhythm. Pulses: Normal pulses. Heart sounds: Normal heart sounds. No murmur heard. Pulmonary:      Effort: Pulmonary effort is normal.      Breath sounds: Normal breath sounds. Abdominal:      General: Bowel sounds are normal.      Palpations: Abdomen is soft. Musculoskeletal:      Cervical back: Normal range of motion and neck supple. Neurological:      Mental Status: He is alert. Psychiatric:         Mood and Affect: Mood normal.         Thought Content: Thought content normal.         Assessment/Plan:     Diagnoses and all orders for this visit:    1. HTN (hypertension), benign  -     METABOLIC PANEL, COMPREHENSIVE; Future  -     CBC W/O DIFF; Future    2. Hyperlipidemia, unspecified hyperlipidemia type  -     LIPID PANEL; Future    3. Type 2 diabetes mellitus with hyperglycemia, without long-term current use of insulin (HCC)  -     HEMOGLOBIN A1C WITH EAG; Future  -     MICROALBUMIN, UR, RAND W/ MICROALB/CREAT RATIO; Future    4. Need for hepatitis C screening test  -     HEPATITIS C AB; Future    5.  Screening for colon cancer  -     REFERRAL TO GASTROENTEROLOGY    Follow up in six month      Pt expressed understanding with the diagnosis and plan        Discussed expected course/resolution/complications of diagnosis in detail with patient.    Medication risks/benefits/costs/interactions/alternatives discussed with patient.    Pt was given an after visit summary which includes diagnoses, current medications & vitals.  Pt expressed understanding with the diagnosis and plan

## 2022-04-29 DIAGNOSIS — E78.5 HYPERLIPIDEMIA, UNSPECIFIED HYPERLIPIDEMIA TYPE: ICD-10-CM

## 2022-05-03 RX ORDER — ATORVASTATIN CALCIUM 40 MG/1
TABLET, FILM COATED ORAL
Qty: 90 TABLET | Refills: 0 | Status: SHIPPED | OUTPATIENT
Start: 2022-05-03

## 2022-05-27 RX ORDER — METFORMIN HYDROCHLORIDE 500 MG/1
TABLET, EXTENDED RELEASE ORAL
Qty: 90 TABLET | Refills: 0 | Status: SHIPPED | OUTPATIENT
Start: 2022-05-27 | End: 2022-09-21

## 2022-06-24 DIAGNOSIS — I10 HTN (HYPERTENSION), BENIGN: ICD-10-CM

## 2022-06-26 RX ORDER — METOPROLOL SUCCINATE 100 MG/1
TABLET, EXTENDED RELEASE ORAL
Qty: 90 TABLET | Refills: 0 | Status: SHIPPED | OUTPATIENT
Start: 2022-06-26 | End: 2022-10-12

## 2022-07-14 DIAGNOSIS — F41.9 ANXIETY: ICD-10-CM

## 2022-07-14 RX ORDER — DULOXETIN HYDROCHLORIDE 60 MG/1
CAPSULE, DELAYED RELEASE ORAL
Qty: 90 CAPSULE | Refills: 0 | Status: SHIPPED | OUTPATIENT
Start: 2022-07-14 | End: 2022-11-04

## 2022-09-20 ENCOUNTER — OFFICE VISIT (OUTPATIENT)
Dept: FAMILY MEDICINE CLINIC | Age: 52
End: 2022-09-20
Payer: COMMERCIAL

## 2022-09-20 VITALS
BODY MASS INDEX: 30.41 KG/M2 | DIASTOLIC BLOOD PRESSURE: 87 MMHG | HEIGHT: 66 IN | RESPIRATION RATE: 16 BRPM | OXYGEN SATURATION: 98 % | HEART RATE: 87 BPM | TEMPERATURE: 98.2 F | WEIGHT: 189.2 LBS | SYSTOLIC BLOOD PRESSURE: 138 MMHG

## 2022-09-20 DIAGNOSIS — E11.65 TYPE 2 DIABETES MELLITUS WITH HYPERGLYCEMIA, WITHOUT LONG-TERM CURRENT USE OF INSULIN (HCC): ICD-10-CM

## 2022-09-20 DIAGNOSIS — I10 HTN (HYPERTENSION), BENIGN: Primary | ICD-10-CM

## 2022-09-20 DIAGNOSIS — Z23 NEEDS FLU SHOT: ICD-10-CM

## 2022-09-20 DIAGNOSIS — E66.9 OBESITY (BMI 30-39.9): ICD-10-CM

## 2022-09-20 DIAGNOSIS — G47.00 INSOMNIA, UNSPECIFIED TYPE: ICD-10-CM

## 2022-09-20 DIAGNOSIS — Z12.5 SCREENING PSA (PROSTATE SPECIFIC ANTIGEN): ICD-10-CM

## 2022-09-20 DIAGNOSIS — E78.5 HYPERLIPIDEMIA, UNSPECIFIED HYPERLIPIDEMIA TYPE: ICD-10-CM

## 2022-09-20 PROCEDURE — 99214 OFFICE O/P EST MOD 30 MIN: CPT | Performed by: NURSE PRACTITIONER

## 2022-09-20 PROCEDURE — 90686 IIV4 VACC NO PRSV 0.5 ML IM: CPT | Performed by: NURSE PRACTITIONER

## 2022-09-20 PROCEDURE — 90471 IMMUNIZATION ADMIN: CPT | Performed by: NURSE PRACTITIONER

## 2022-09-20 PROCEDURE — 3044F HG A1C LEVEL LT 7.0%: CPT | Performed by: NURSE PRACTITIONER

## 2022-09-20 RX ORDER — HYDROXYZINE PAMOATE 25 MG/1
CAPSULE ORAL
Qty: 30 CAPSULE | Refills: 0 | Status: SHIPPED | OUTPATIENT
Start: 2022-09-20

## 2022-09-20 NOTE — PROGRESS NOTES
Chief Complaint   Patient presents with    Diabetes    Hypertension    Cholesterol Problem         1. \"Have you been to the ER, urgent care clinic since your last visit? Hospitalized since your last visit? \" No    2. \"Have you seen or consulted any other health care providers outside of the [address] [place] [address] since your last visit? \" No     3. For patients over 45: Has the patient had a colonoscopy?  No       3 most recent PHQ Screens 9/20/2022   Little interest or pleasure in doing things Not at all   Feeling down, depressed, irritable, or hopeless Not at all   Total Score PHQ 2 0       Health Maintenance Due   Topic Date Due    COVID-19 Vaccine (1) Never done    Colorectal Cancer Screening Combo  Never done    Shingrix Vaccine Age 50> (1 of 2) Never done    Pneumococcal 0-64 years (2 - PCV) 10/21/2021    Flu Vaccine (1) 09/01/2022

## 2022-09-20 NOTE — PROGRESS NOTES
[facility] [facility] Note  Subjective:      Ifeanyi Harvey is a 47 y.o. male who presents for follow up on chronic problems, he has history of hypertension, hyperlipidemia, diabetes, and anxiety. He is requesting something for sleep, states that he is unable to sleep due to anxiety. He is also requesting to check for aneurysm and  have heart scan order for him to make sure he is OK. Due for colonoscopy will make appointment  Due for flu vaccine    Past Medical History:   Diagnosis Date    Anxiety disorder     Cataracts, both eyes     Diabetes ([address]     Hypercholesterolemia     Hypertension     Sleep apnea 11/2015     Past Surgical History:   Procedure Laterality Date    HX HEENT Bilateral 11/2017    cataracts in both eyes. Current Outpatient Medications   Medication Sig Dispense Refill    hydrOXYzine pamoate (VISTARIL) 25 mg capsule Take 1 tab Qhs 30 Capsule 0    DULoxetine (CYMBALTA) 60 mg capsule Take 1 capsule by mouth once daily 90 Capsule 0    metoprolol succinate (TOPROL-XL) 100 mg tablet Take 1 tablet by mouth once daily 90 Tablet 0    metFORMIN ER (GLUCOPHAGE XR) 500 mg tablet TAKE 1 TABLET BY MOUTH ONCE DAILY WITH SUPPER 90 Tablet 0    atorvastatin (LIPITOR) 40 mg tablet Take 1 tablet by mouth once daily 90 Tablet 0    cholecalciferol (VITAMIN D3) (1000 Units /25 mcg) tablet Take 1,000 Units by mouth daily. aspirin delayed-release 81 mg tablet Take 1 Tab by mouth daily. 30 Tab 11     No Known Allergies    ROS:   Complete review of systems was reviewed with pertinent information listed in HPI. Review of Systems   Constitutional: Negative. HENT: Negative. Respiratory: Negative. Cardiovascular: Negative. Gastrointestinal: Negative. Genitourinary: Negative. Musculoskeletal: Negative. Psychiatric/Behavioral:  Negative for depression, hallucinations, memory loss, substance abuse and suicidal ideas. The patient is nervous/anxious and has insomnia.       Objective: Visit Vitals  /87 (BP 1 Location: Left upper arm, BP Patient Position: Sitting, BP Cuff Size: Adult)   Pulse 87   Temp 98.2 °F (36.8 °C) (Temporal)   Resp 16   Ht 5' 6\" (1.676 m)   Wt 189 lb 3.2 oz (85.8 kg)   SpO2 98%   BMI 30.54 kg/m²       Vitals and Nurse Documentation reviewed. Physical Exam  Constitutional:       Appearance: Normal appearance. HENT:      Mouth/Throat:      Mouth: Mucous membranes are moist.   Cardiovascular:      Rate and Rhythm: Normal rate and regular rhythm. Pulses: Normal pulses. Heart sounds: Normal heart sounds. No murmur heard. Pulmonary:      Effort: Pulmonary effort is normal.      Breath sounds: Normal breath sounds. Abdominal:      General: Bowel sounds are normal.      Palpations: Abdomen is soft. Musculoskeletal:      Cervical back: Normal range of motion and neck supple. Neurological:      Mental Status: He is alert. Psychiatric:         Mood and Affect: Mood normal.         Thought Content: Thought content normal.       Assessment/Plan:     Diagnoses and all orders for this visit:    1. HTN (hypertension), benign  -     CBC W/O DIFF; Future    2. Hyperlipidemia, unspecified hyperlipidemia type  -     LIPID PANEL; Future  -     METABOLIC PANEL, COMPREHENSIVE; Future    3. Type 2 diabetes mellitus with hyperglycemia, without long-term current use of insulin (HCC)  -     HEMOGLOBIN A1C WITH EAG; Future    4. Obesity (BMI 30-39.9)    5. Screening PSA (prostate specific antigen)  -     PSA W/ REFLX FREE PSA; Future    6. Insomnia, unspecified type  -     hydrOXYzine pamoate (VISTARIL) 25 mg capsule; Take 1 tab Qhs    7. Needs flu shot  -     INFLUENZA, FLUARIX, FLULAVAL, FLUZONE (AGE 6 MO+), AFLURIA(AGE 3Y+) IM, PF, 0.5 ML          Pt expressed understanding with the diagnosis and plan        Discussed expected course/resolution/complications of diagnosis in detail with patient.     Medication risks/benefits/costs/interactions/alternatives discussed with patient. Pt was given an after visit summary which includes diagnoses, current medications & vitals.   Pt expressed understanding with the diagnosis and plan

## 2022-09-21 LAB
ALBUMIN SERPL-MCNC: 3.9 G/DL (ref 3.5–5)
ALBUMIN/GLOB SERPL: 1.1 {RATIO} (ref 1.1–2.2)
ALP SERPL-CCNC: 81 U/L (ref 45–117)
ALT SERPL-CCNC: 34 U/L (ref 12–78)
ANION GAP SERPL CALC-SCNC: 5 MMOL/L (ref 5–15)
AST SERPL-CCNC: 22 U/L (ref 15–37)
BILIRUB SERPL-MCNC: 0.4 MG/DL (ref 0.2–1)
BUN SERPL-MCNC: 17 MG/DL (ref 6–20)
BUN/CREAT SERPL: 17 (ref 12–20)
CALCIUM SERPL-MCNC: 9.5 MG/DL (ref 8.5–10.1)
CHLORIDE SERPL-SCNC: 108 MMOL/L (ref 97–108)
CHOLEST SERPL-MCNC: 122 MG/DL
CO2 SERPL-SCNC: 27 MMOL/L (ref 21–32)
CREAT SERPL-MCNC: 1.03 MG/DL (ref 0.7–1.3)
ERYTHROCYTE [DISTWIDTH] IN BLOOD BY AUTOMATED COUNT: 12.2 % (ref 11.5–14.5)
EST. AVERAGE GLUCOSE BLD GHB EST-MCNC: 131 MG/DL
GLOBULIN SER CALC-MCNC: 3.4 G/DL (ref 2–4)
GLUCOSE SERPL-MCNC: 126 MG/DL (ref 65–100)
HBA1C MFR BLD: 6.2 % (ref 4–5.6)
HCT VFR BLD AUTO: 40.4 % (ref 36.6–50.3)
HDLC SERPL-MCNC: 33 MG/DL
HDLC SERPL: 3.7 {RATIO} (ref 0–5)
HGB BLD-MCNC: 13.5 G/DL (ref 12.1–17)
LDLC SERPL CALC-MCNC: 81.6 MG/DL (ref 0–100)
MCH RBC QN AUTO: 29.9 PG (ref 26–34)
MCHC RBC AUTO-ENTMCNC: 33.4 G/DL (ref 30–36.5)
MCV RBC AUTO: 89.4 FL (ref 80–99)
NRBC # BLD: 0 K/UL (ref 0–0.01)
NRBC BLD-RTO: 0 PER 100 WBC
PLATELET # BLD AUTO: 217 K/UL (ref 150–400)
PMV BLD AUTO: 10.3 FL (ref 8.9–12.9)
POTASSIUM SERPL-SCNC: 4.3 MMOL/L (ref 3.5–5.1)
PROT SERPL-MCNC: 7.3 G/DL (ref 6.4–8.2)
RBC # BLD AUTO: 4.52 M/UL (ref 4.1–5.7)
SODIUM SERPL-SCNC: 140 MMOL/L (ref 136–145)
TRIGL SERPL-MCNC: 37 MG/DL (ref ?–150)
VLDLC SERPL CALC-MCNC: 7.4 MG/DL
WBC # BLD AUTO: 4.2 K/UL (ref 4.1–11.1)

## 2022-09-21 RX ORDER — METFORMIN HYDROCHLORIDE 500 MG/1
TABLET, EXTENDED RELEASE ORAL
Qty: 90 TABLET | Refills: 0 | Status: SHIPPED | OUTPATIENT
Start: 2022-09-21

## 2022-09-22 LAB
PSA SERPL-MCNC: 1.1 NG/ML (ref 0–4)
REFLEX CRITERIA: NORMAL

## 2022-10-12 DIAGNOSIS — I10 HTN (HYPERTENSION), BENIGN: ICD-10-CM

## 2022-10-12 RX ORDER — METOPROLOL SUCCINATE 100 MG/1
TABLET, EXTENDED RELEASE ORAL
Qty: 90 TABLET | Refills: 0 | Status: SHIPPED | OUTPATIENT
Start: 2022-10-12

## 2022-11-03 DIAGNOSIS — F41.9 ANXIETY: ICD-10-CM

## 2022-11-04 RX ORDER — DULOXETIN HYDROCHLORIDE 60 MG/1
CAPSULE, DELAYED RELEASE ORAL
Qty: 90 CAPSULE | Refills: 0 | Status: SHIPPED | OUTPATIENT
Start: 2022-11-04

## 2022-12-06 DIAGNOSIS — E78.5 HYPERLIPIDEMIA, UNSPECIFIED HYPERLIPIDEMIA TYPE: ICD-10-CM

## 2022-12-06 RX ORDER — ATORVASTATIN CALCIUM 40 MG/1
TABLET, FILM COATED ORAL
Qty: 90 TABLET | Refills: 0 | Status: SHIPPED | OUTPATIENT
Start: 2022-12-06

## 2022-12-22 DIAGNOSIS — G47.00 INSOMNIA, UNSPECIFIED TYPE: ICD-10-CM

## 2022-12-22 RX ORDER — HYDROXYZINE PAMOATE 25 MG/1
CAPSULE ORAL
Qty: 30 CAPSULE | Refills: 0 | Status: SHIPPED | OUTPATIENT
Start: 2022-12-22

## 2022-12-28 RX ORDER — METFORMIN HYDROCHLORIDE 500 MG/1
TABLET, EXTENDED RELEASE ORAL
Qty: 90 TABLET | Refills: 0 | Status: SHIPPED | OUTPATIENT
Start: 2022-12-28

## 2023-01-24 DIAGNOSIS — I10 HTN (HYPERTENSION), BENIGN: ICD-10-CM

## 2023-01-25 RX ORDER — METOPROLOL SUCCINATE 100 MG/1
TABLET, EXTENDED RELEASE ORAL
Qty: 90 TABLET | Refills: 0 | Status: SHIPPED | OUTPATIENT
Start: 2023-01-25

## 2023-02-07 ENCOUNTER — OFFICE VISIT (OUTPATIENT)
Dept: FAMILY MEDICINE CLINIC | Age: 53
End: 2023-02-07
Payer: COMMERCIAL

## 2023-02-07 VITALS
HEIGHT: 66 IN | WEIGHT: 193.8 LBS | SYSTOLIC BLOOD PRESSURE: 125 MMHG | HEART RATE: 80 BPM | DIASTOLIC BLOOD PRESSURE: 78 MMHG | BODY MASS INDEX: 31.15 KG/M2 | RESPIRATION RATE: 16 BRPM | TEMPERATURE: 97.6 F | OXYGEN SATURATION: 98 %

## 2023-02-07 DIAGNOSIS — E66.9 OBESITY (BMI 30-39.9): ICD-10-CM

## 2023-02-07 DIAGNOSIS — I10 HTN (HYPERTENSION), BENIGN: Primary | ICD-10-CM

## 2023-02-07 DIAGNOSIS — E78.5 HYPERLIPIDEMIA, UNSPECIFIED HYPERLIPIDEMIA TYPE: ICD-10-CM

## 2023-02-07 DIAGNOSIS — F41.9 ANXIETY: ICD-10-CM

## 2023-02-07 DIAGNOSIS — E11.65 TYPE 2 DIABETES MELLITUS WITH HYPERGLYCEMIA, WITHOUT LONG-TERM CURRENT USE OF INSULIN (HCC): ICD-10-CM

## 2023-02-07 PROCEDURE — 3074F SYST BP LT 130 MM HG: CPT | Performed by: NURSE PRACTITIONER

## 2023-02-07 PROCEDURE — 3078F DIAST BP <80 MM HG: CPT | Performed by: NURSE PRACTITIONER

## 2023-02-07 PROCEDURE — 99213 OFFICE O/P EST LOW 20 MIN: CPT | Performed by: NURSE PRACTITIONER

## 2023-02-07 NOTE — PROGRESS NOTES
[facility] [facility] Note  Subjective:      Tiffanie Nunez is a 47 y.o. male who presents for follow up on chronic problems, he has history of hypertension, hyperlipidemia, diabetes, obesity and anxiety. Taking medications as prescribed, no medications side effects reported. Today he is not fasting    Past Medical History:   Diagnosis Date    Anxiety disorder     Cataracts, both eyes     Diabetes ([address]     Hypercholesterolemia     Hypertension     Sleep apnea 11/2015     Past Surgical History:   Procedure Laterality Date    HX HEENT Bilateral 11/2017    cataracts in both eyes. Current Outpatient Medications   Medication Sig Dispense Refill    metoprolol succinate (TOPROL-XL) 100 mg tablet Take 1 tablet by mouth once daily 90 Tablet 0    metFORMIN ER (GLUCOPHAGE XR) 500 mg tablet TAKE 1 TABLET BY MOUTH ONCE DAILY WITH SUPPER 90 Tablet 0    hydrOXYzine pamoate (VISTARIL) 25 mg capsule TAKE 1 CAPSULE BY MOUTH EVERY DAY AT BEDTIME 30 Capsule 0    atorvastatin (LIPITOR) 40 mg tablet Take 1 tablet by mouth once daily 90 Tablet 0    DULoxetine (CYMBALTA) 60 mg capsule Take 1 capsule by mouth once daily 90 Capsule 0    cholecalciferol (VITAMIN D3) (1000 Units /25 mcg) tablet Take 1,000 Units by mouth daily. aspirin delayed-release 81 mg tablet Take 1 Tab by mouth daily. 30 Tab 11     No Known Allergies    ROS:   Complete review of systems was reviewed with pertinent information listed in HPI. Review of Systems   Constitutional: Negative. HENT: Negative. Respiratory: Negative. Cardiovascular: Negative. Gastrointestinal: Negative. Genitourinary: Negative. Musculoskeletal: Negative. Psychiatric/Behavioral:  The patient is nervous/anxious.       Objective:   Visit Vitals  /78 (BP 1 Location: Right arm, BP Patient Position: Sitting, BP Cuff Size: Adult)   Pulse 80   Temp 97.6 °F (36.4 °C) (Temporal)   Resp 16   Ht 5' 6\" (1.676 m)   Wt 193 lb 12.8 oz (87.9 kg)   SpO2 98%   BMI 31.28 kg/m²       Vitals and Nurse Documentation reviewed. Physical Exam  Constitutional:       Appearance: Normal appearance. HENT:      Mouth/Throat:      Mouth: Mucous membranes are moist.   Cardiovascular:      Rate and Rhythm: Normal rate and regular rhythm. Pulses: Normal pulses. Heart sounds: Normal heart sounds. No murmur heard. Pulmonary:      Effort: Pulmonary effort is normal.      Breath sounds: Normal breath sounds. Abdominal:      Palpations: Abdomen is soft. Musculoskeletal:      Cervical back: Normal range of motion and neck supple. Neurological:      Mental Status: He is alert. Psychiatric:         Mood and Affect: Mood normal.         Thought Content: Thought content normal.       Assessment/Plan:     Diagnoses and all orders for this visit:    1. HTN (hypertension), benign  -     CBC W/O DIFF; Future    2. Hyperlipidemia, unspecified hyperlipidemia type  -     LIPID PANEL; Future  -     METABOLIC PANEL, COMPREHENSIVE; Future    3. Obesity (BMI 30-39. 9)          Diet and exercise   4. Type 2 diabetes mellitus with hyperglycemia, without long-term current use of insulin (HCC)  -     HEMOGLOBIN A1C WITH EAG; Future    5. Anxiety      Continue with Cymbalat        Pt expressed understanding with the diagnosis and plan        Discussed expected course/resolution/complications of diagnosis in detail with patient. Medication risks/benefits/costs/interactions/alternatives discussed with patient. Pt was given an after visit summary which includes diagnoses, current medications & vitals.   Pt expressed understanding with the diagnosis and plan

## 2023-02-08 LAB
ALBUMIN SERPL-MCNC: 4 G/DL (ref 3.5–5)
ALBUMIN/GLOB SERPL: 1.3 (ref 1.1–2.2)
ALP SERPL-CCNC: 83 U/L (ref 45–117)
ALT SERPL-CCNC: 37 U/L (ref 12–78)
ANION GAP SERPL CALC-SCNC: 6 MMOL/L (ref 5–15)
AST SERPL-CCNC: 22 U/L (ref 15–37)
BILIRUB SERPL-MCNC: 0.3 MG/DL (ref 0.2–1)
BUN SERPL-MCNC: 18 MG/DL (ref 6–20)
BUN/CREAT SERPL: 13 (ref 12–20)
CALCIUM SERPL-MCNC: 9.2 MG/DL (ref 8.5–10.1)
CHLORIDE SERPL-SCNC: 104 MMOL/L (ref 97–108)
CHOLEST SERPL-MCNC: 128 MG/DL
CO2 SERPL-SCNC: 26 MMOL/L (ref 21–32)
CREAT SERPL-MCNC: 1.34 MG/DL (ref 0.7–1.3)
ERYTHROCYTE [DISTWIDTH] IN BLOOD BY AUTOMATED COUNT: 12.1 % (ref 11.5–14.5)
EST. AVERAGE GLUCOSE BLD GHB EST-MCNC: 126 MG/DL
GLOBULIN SER CALC-MCNC: 3.1 G/DL (ref 2–4)
GLUCOSE SERPL-MCNC: 115 MG/DL (ref 65–100)
HBA1C MFR BLD: 6 % (ref 4–5.6)
HCT VFR BLD AUTO: 41.4 % (ref 36.6–50.3)
HDLC SERPL-MCNC: 49 MG/DL
HDLC SERPL: 2.6 (ref 0–5)
HGB BLD-MCNC: 13.8 G/DL (ref 12.1–17)
LDLC SERPL CALC-MCNC: 45.2 MG/DL (ref 0–100)
MCH RBC QN AUTO: 29.9 PG (ref 26–34)
MCHC RBC AUTO-ENTMCNC: 33.3 G/DL (ref 30–36.5)
MCV RBC AUTO: 89.8 FL (ref 80–99)
NRBC # BLD: 0 K/UL (ref 0–0.01)
NRBC BLD-RTO: 0 PER 100 WBC
PLATELET # BLD AUTO: 264 K/UL (ref 150–400)
PMV BLD AUTO: 10 FL (ref 8.9–12.9)
POTASSIUM SERPL-SCNC: 4.1 MMOL/L (ref 3.5–5.1)
PROT SERPL-MCNC: 7.1 G/DL (ref 6.4–8.2)
RBC # BLD AUTO: 4.61 M/UL (ref 4.1–5.7)
SODIUM SERPL-SCNC: 136 MMOL/L (ref 136–145)
TRIGL SERPL-MCNC: 169 MG/DL (ref ?–150)
VLDLC SERPL CALC-MCNC: 33.8 MG/DL
WBC # BLD AUTO: 6.3 K/UL (ref 4.1–11.1)

## 2023-02-13 DIAGNOSIS — F41.9 ANXIETY: ICD-10-CM

## 2023-02-14 RX ORDER — DULOXETIN HYDROCHLORIDE 60 MG/1
CAPSULE, DELAYED RELEASE ORAL
Qty: 90 CAPSULE | Refills: 0 | Status: SHIPPED | OUTPATIENT
Start: 2023-02-14

## 2023-04-06 NOTE — TELEPHONE ENCOUNTER
Requested Prescriptions     Pending Prescriptions Disp Refills    metFORMIN ER (GLUCOPHAGE XR) 500 mg tablet [Pharmacy Med Name: metFORMIN HCl  MG Oral Tablet Extended Release 24 Hour] 90 Tablet 0     Sig: TAKE 1 TABLET BY MOUTH ONCE DAILY WITH SUPPER

## 2023-05-14 RX ORDER — METOPROLOL SUCCINATE 100 MG/1
100 TABLET, EXTENDED RELEASE ORAL DAILY
Qty: 90 TABLET | Refills: 0 | Status: SHIPPED | OUTPATIENT
Start: 2023-05-14

## 2023-05-19 RX ORDER — DULOXETIN HYDROCHLORIDE 60 MG/1
60 CAPSULE, DELAYED RELEASE ORAL DAILY
Qty: 90 CAPSULE | Refills: 0 | Status: SHIPPED | OUTPATIENT
Start: 2023-05-19

## 2023-06-06 ENCOUNTER — OFFICE VISIT (OUTPATIENT)
Age: 53
End: 2023-06-06

## 2023-06-06 VITALS
WEIGHT: 193 LBS | SYSTOLIC BLOOD PRESSURE: 154 MMHG | TEMPERATURE: 98.6 F | HEIGHT: 66 IN | DIASTOLIC BLOOD PRESSURE: 106 MMHG | BODY MASS INDEX: 31.02 KG/M2 | RESPIRATION RATE: 18 BRPM | OXYGEN SATURATION: 99 % | HEART RATE: 72 BPM

## 2023-06-06 DIAGNOSIS — G89.29 CHRONIC PAIN OF LEFT KNEE: Primary | ICD-10-CM

## 2023-06-06 DIAGNOSIS — R03.0 ELEVATED BLOOD PRESSURE READING: ICD-10-CM

## 2023-06-06 DIAGNOSIS — M25.562 CHRONIC PAIN OF LEFT KNEE: Primary | ICD-10-CM

## 2023-06-06 DIAGNOSIS — M17.12 TRICOMPARTMENT OSTEOARTHRITIS OF LEFT KNEE: ICD-10-CM

## 2023-06-06 NOTE — PROGRESS NOTES
pressure reading  3. Tricompartment osteoarthritis of left knee  -     Ambulatory referral to Orthopedic Surgery       XR shows tricompartmental OA with effusion  Referral to ortho placed. OTC tylenol arthritis as directed for pain  BP- established hx of HTN on metoprolol. States Bps better at home. Advised to monitor, record and follow up with PCP in 6 weeks if still elevated. Xray Result (most recent):  XR KNEE LEFT (3 VIEWS) 06/06/2023    Narrative  EXAM: XR KNEE LEFT (3 VIEWS)    INDICATION: Pain in left knee; Other chronic pain. COMPARISON: None. FINDINGS: Three views of the left knee demonstrate tricompartment  osteoarthritis. . There is a small joint effusion. No visible fracture. Impression  1. Tricompartment osteoarthritis with a small joint effusion.             ERIC Santiago - NP

## 2023-06-30 RX ORDER — METFORMIN HYDROCHLORIDE 500 MG/1
TABLET, EXTENDED RELEASE ORAL
Qty: 90 TABLET | Refills: 0 | OUTPATIENT
Start: 2023-06-30

## 2023-07-05 RX ORDER — METFORMIN HYDROCHLORIDE 500 MG/1
TABLET, EXTENDED RELEASE ORAL
Qty: 90 TABLET | Refills: 0 | Status: SHIPPED | OUTPATIENT
Start: 2023-07-05

## 2023-07-06 RX ORDER — ATORVASTATIN CALCIUM 40 MG/1
40 TABLET, FILM COATED ORAL DAILY
Qty: 90 TABLET | Refills: 0 | Status: SHIPPED | OUTPATIENT
Start: 2023-07-06

## 2023-07-06 NOTE — TELEPHONE ENCOUNTER
Last appointment: 2/7/23 NP [address], lipid 2/2023  Next appointment: 8/3/23 Jennifer Van  Previous refill encounter(s): 12/6/22 90    Requested Prescriptions     Pending Prescriptions Disp Refills    atorvastatin (LIPITOR) 40 MG tablet 90 tablet 0     Sig: Take 1 tablet by mouth daily     For Pharmacy Admin Tracking Only    Program: Medication Refill  Intervention Detail: New Rx: 1, reason: Patient Preference  Time Spent (min): 5

## 2023-08-01 SDOH — ECONOMIC STABILITY: INCOME INSECURITY: HOW HARD IS IT FOR YOU TO PAY FOR THE VERY BASICS LIKE FOOD, HOUSING, MEDICAL CARE, AND HEATING?: NOT HARD AT ALL

## 2023-08-01 SDOH — ECONOMIC STABILITY: HOUSING INSECURITY
IN THE LAST 12 MONTHS, WAS THERE A TIME WHEN YOU DID NOT HAVE A STEADY PLACE TO SLEEP OR SLEPT IN A SHELTER (INCLUDING NOW)?: NO

## 2023-08-01 SDOH — ECONOMIC STABILITY: FOOD INSECURITY: WITHIN THE PAST 12 MONTHS, YOU WORRIED THAT YOUR FOOD WOULD RUN OUT BEFORE YOU GOT MONEY TO BUY MORE.: NEVER TRUE

## 2023-08-01 SDOH — ECONOMIC STABILITY: FOOD INSECURITY: WITHIN THE PAST 12 MONTHS, THE FOOD YOU BOUGHT JUST DIDN'T LAST AND YOU DIDN'T HAVE MONEY TO GET MORE.: NEVER TRUE

## 2023-08-01 SDOH — ECONOMIC STABILITY: TRANSPORTATION INSECURITY
IN THE PAST 12 MONTHS, HAS LACK OF TRANSPORTATION KEPT YOU FROM MEETINGS, WORK, OR FROM GETTING THINGS NEEDED FOR DAILY LIVING?: NO

## 2023-08-03 ENCOUNTER — OFFICE VISIT (OUTPATIENT)
Age: 53
End: 2023-08-03
Payer: COMMERCIAL

## 2023-08-03 VITALS
SYSTOLIC BLOOD PRESSURE: 138 MMHG | OXYGEN SATURATION: 98 % | TEMPERATURE: 97.9 F | BODY MASS INDEX: 31.4 KG/M2 | DIASTOLIC BLOOD PRESSURE: 88 MMHG | WEIGHT: 195.4 LBS | HEART RATE: 70 BPM | RESPIRATION RATE: 16 BRPM | HEIGHT: 66 IN

## 2023-08-03 DIAGNOSIS — Z76.89 ENCOUNTER TO ESTABLISH CARE: Primary | ICD-10-CM

## 2023-08-03 DIAGNOSIS — Z23 NEED FOR SHINGLES VACCINE: ICD-10-CM

## 2023-08-03 DIAGNOSIS — M17.12 TRICOMPARTMENT OSTEOARTHRITIS OF LEFT KNEE: ICD-10-CM

## 2023-08-03 DIAGNOSIS — Z12.11 ENCOUNTER FOR SCREENING FOR MALIGNANT NEOPLASM OF COLON: ICD-10-CM

## 2023-08-03 DIAGNOSIS — I10 HTN (HYPERTENSION), BENIGN: ICD-10-CM

## 2023-08-03 DIAGNOSIS — E78.2 MIXED HYPERLIPIDEMIA: ICD-10-CM

## 2023-08-03 DIAGNOSIS — E11.65 TYPE 2 DIABETES MELLITUS WITH HYPERGLYCEMIA, WITHOUT LONG-TERM CURRENT USE OF INSULIN (HCC): ICD-10-CM

## 2023-08-03 DIAGNOSIS — F41.9 ANXIETY: ICD-10-CM

## 2023-08-03 LAB
ALBUMIN SERPL-MCNC: 4.2 G/DL (ref 3.5–5)
ALBUMIN/GLOB SERPL: 1.2 (ref 1.1–2.2)
ALP SERPL-CCNC: 85 U/L (ref 45–117)
ALT SERPL-CCNC: 42 U/L (ref 12–78)
ANION GAP SERPL CALC-SCNC: 4 MMOL/L (ref 5–15)
AST SERPL-CCNC: 28 U/L (ref 15–37)
BILIRUB SERPL-MCNC: 0.8 MG/DL (ref 0.2–1)
BUN SERPL-MCNC: 18 MG/DL (ref 6–20)
BUN/CREAT SERPL: 18 (ref 12–20)
CALCIUM SERPL-MCNC: 9.3 MG/DL (ref 8.5–10.1)
CHLORIDE SERPL-SCNC: 103 MMOL/L (ref 97–108)
CO2 SERPL-SCNC: 31 MMOL/L (ref 21–32)
CREAT SERPL-MCNC: 0.99 MG/DL (ref 0.7–1.3)
CREAT UR-MCNC: 274 MG/DL
EST. AVERAGE GLUCOSE BLD GHB EST-MCNC: 123 MG/DL
GLOBULIN SER CALC-MCNC: 3.6 G/DL (ref 2–4)
GLUCOSE SERPL-MCNC: 118 MG/DL (ref 65–100)
HBA1C MFR BLD: 5.9 % (ref 4–5.6)
MICROALBUMIN UR-MCNC: 1.68 MG/DL
MICROALBUMIN/CREAT UR-RTO: 6 MG/G (ref 0–30)
POTASSIUM SERPL-SCNC: 4.1 MMOL/L (ref 3.5–5.1)
PROT SERPL-MCNC: 7.8 G/DL (ref 6.4–8.2)
SODIUM SERPL-SCNC: 138 MMOL/L (ref 136–145)

## 2023-08-03 PROCEDURE — 3044F HG A1C LEVEL LT 7.0%: CPT | Performed by: NURSE PRACTITIONER

## 2023-08-03 PROCEDURE — 3079F DIAST BP 80-89 MM HG: CPT | Performed by: NURSE PRACTITIONER

## 2023-08-03 PROCEDURE — 90471 IMMUNIZATION ADMIN: CPT | Performed by: NURSE PRACTITIONER

## 2023-08-03 PROCEDURE — 90750 HZV VACC RECOMBINANT IM: CPT | Performed by: NURSE PRACTITIONER

## 2023-08-03 PROCEDURE — 99214 OFFICE O/P EST MOD 30 MIN: CPT | Performed by: NURSE PRACTITIONER

## 2023-08-03 PROCEDURE — 3075F SYST BP GE 130 - 139MM HG: CPT | Performed by: NURSE PRACTITIONER

## 2023-08-03 RX ORDER — HYDROXYZINE PAMOATE 25 MG/1
25 CAPSULE ORAL 3 TIMES DAILY PRN
Qty: 90 CAPSULE | Refills: 1 | Status: SHIPPED | OUTPATIENT
Start: 2023-08-03 | End: 2023-11-01

## 2023-08-03 RX ORDER — METFORMIN HYDROCHLORIDE 500 MG/1
500 TABLET, EXTENDED RELEASE ORAL
Qty: 90 TABLET | Refills: 1 | Status: SHIPPED | OUTPATIENT
Start: 2023-08-03

## 2023-08-03 RX ORDER — METOPROLOL SUCCINATE 100 MG/1
100 TABLET, EXTENDED RELEASE ORAL DAILY
Qty: 90 TABLET | Refills: 0 | Status: SHIPPED | OUTPATIENT
Start: 2023-08-03

## 2023-08-03 RX ORDER — DULOXETIN HYDROCHLORIDE 60 MG/1
60 CAPSULE, DELAYED RELEASE ORAL DAILY
Qty: 90 CAPSULE | Refills: 1 | Status: SHIPPED | OUTPATIENT
Start: 2023-08-03

## 2023-08-03 RX ORDER — ATORVASTATIN CALCIUM 40 MG/1
40 TABLET, FILM COATED ORAL DAILY
Qty: 90 TABLET | Refills: 1 | Status: SHIPPED | OUTPATIENT
Start: 2023-08-03

## 2023-09-19 ENCOUNTER — OFFICE VISIT (OUTPATIENT)
Age: 53
End: 2023-09-19

## 2023-09-19 VITALS
RESPIRATION RATE: 15 BRPM | TEMPERATURE: 96.4 F | HEIGHT: 66 IN | BODY MASS INDEX: 31.66 KG/M2 | OXYGEN SATURATION: 99 % | SYSTOLIC BLOOD PRESSURE: 149 MMHG | WEIGHT: 197 LBS | HEART RATE: 93 BPM | DIASTOLIC BLOOD PRESSURE: 101 MMHG

## 2023-09-19 DIAGNOSIS — M25.561 RIGHT KNEE PAIN, UNSPECIFIED CHRONICITY: Primary | ICD-10-CM

## 2023-09-19 DIAGNOSIS — M17.0 BILATERAL PRIMARY OSTEOARTHRITIS OF KNEE: ICD-10-CM

## 2023-09-19 RX ORDER — BETAMETHASONE SODIUM PHOSPHATE AND BETAMETHASONE ACETATE 3; 3 MG/ML; MG/ML
6 INJECTION, SUSPENSION INTRA-ARTICULAR; INTRALESIONAL; INTRAMUSCULAR; SOFT TISSUE ONCE
Status: COMPLETED | OUTPATIENT
Start: 2023-09-19 | End: 2023-09-19

## 2023-09-19 RX ADMIN — BETAMETHASONE SODIUM PHOSPHATE AND BETAMETHASONE ACETATE 6 MG: 3; 3 INJECTION, SUSPENSION INTRA-ARTICULAR; INTRALESIONAL; INTRAMUSCULAR; SOFT TISSUE at 09:32

## 2023-09-19 SDOH — HEALTH STABILITY: PHYSICAL HEALTH: ON AVERAGE, HOW MANY DAYS PER WEEK DO YOU ENGAGE IN MODERATE TO STRENUOUS EXERCISE (LIKE A BRISK WALK)?: 5 DAYS

## 2023-09-19 ASSESSMENT — PATIENT HEALTH QUESTIONNAIRE - PHQ9
1. LITTLE INTEREST OR PLEASURE IN DOING THINGS: 0
SUM OF ALL RESPONSES TO PHQ QUESTIONS 1-9: 0
2. FEELING DOWN, DEPRESSED OR HOPELESS: 0
SUM OF ALL RESPONSES TO PHQ QUESTIONS 1-9: 0
SUM OF ALL RESPONSES TO PHQ9 QUESTIONS 1 & 2: 0
SUM OF ALL RESPONSES TO PHQ QUESTIONS 1-9: 0
SUM OF ALL RESPONSES TO PHQ QUESTIONS 1-9: 0

## 2023-09-19 ASSESSMENT — SOCIAL DETERMINANTS OF HEALTH (SDOH)
WITHIN THE LAST YEAR, HAVE TO BEEN RAPED OR FORCED TO HAVE ANY KIND OF SEXUAL ACTIVITY BY YOUR PARTNER OR EX-PARTNER?: NO
WITHIN THE LAST YEAR, HAVE YOU BEEN HUMILIATED OR EMOTIONALLY ABUSED IN OTHER WAYS BY YOUR PARTNER OR EX-PARTNER?: NO
WITHIN THE LAST YEAR, HAVE YOU BEEN KICKED, HIT, SLAPPED, OR OTHERWISE PHYSICALLY HURT BY YOUR PARTNER OR EX-PARTNER?: NO
WITHIN THE LAST YEAR, HAVE YOU BEEN AFRAID OF YOUR PARTNER OR EX-PARTNER?: NO

## 2023-09-20 ENCOUNTER — TELEPHONE (OUTPATIENT)
Age: 53
End: 2023-09-20

## 2023-09-20 LAB
HEMOCCULT STL QL: NEGATIVE
VALID INTERNAL CONTROL: YES

## 2023-09-20 NOTE — TELEPHONE ENCOUNTER
Results documented in chart.     ----- Message from [facility] RENNY VIDES sent at 9/19/2023  2:22 PM EDT -----  Regarding: Fit Test  The Fit Test results for Mr. Pascual Mcelroy are NEGATIVE.

## 2023-09-27 ENCOUNTER — SCHEDULED TELEPHONE ENCOUNTER (OUTPATIENT)
Age: 53
End: 2023-09-27

## 2023-09-27 DIAGNOSIS — M17.0 BILATERAL PRIMARY OSTEOARTHRITIS OF KNEE: Primary | ICD-10-CM

## 2023-09-27 NOTE — PROGRESS NOTES
per week    Drug use: No    Sexual activity: Not Currently     Social Determinants of Health     Financial Resource Strain: Low Risk  (8/1/2023)    Overall Financial Resource Strain (CARDIA)     Difficulty of Paying Living Expenses: Not hard at all   Food Insecurity: No Food Insecurity (8/1/2023)    Hunger Vital Sign     Worried About Running Out of Food in the Last Year: Never true     Ran Out of Food in the Last Year: Never true   Transportation Needs: Unknown (8/1/2023)    PRAPARE - Transportation     Lack of Transportation (Non-Medical): No   Physical Activity: Unknown (9/19/2023)    Exercise Vital Sign     Days of Exercise per Week: 5 days   Intimate Partner Violence: Not At Risk (9/19/2023)    Humiliation, Afraid, Rape, and Kick questionnaire     Fear of Current or Ex-Partner: No     Emotionally Abused: No     Physically Abused: No     Sexually Abused: No   Housing Stability: Unknown (8/1/2023)    Housing Stability Vital Sign     Unstable Housing in the Last Year: No       Family Hx:  Family History   Problem Relation Age of Onset    Diabetes Father     Hypertension Father     Early Death Father     Lupus Father     No Known Problems Mother          Review of Systems:       General: Denies headache, lethargy, fever, weight loss  Ears/Nose/Throat: Denies ear discharge, drainage, nosebleeds, hoarse voice, dental problems  Cardiovascular: Denies chest pain, shortness of breath  Lungs: Denies chest pain, breathing problems, wheezing, pneumonia  Stomach: Denies stomach pain, heartburn, constipation, irritable bowel  Skin: Denies rash, sores, open wounds  Musculoskeletal: right knee pain - resolved  Genitourinary: Denies dysuria, hematuria, polyuria  Gastrointestinal: Denies constipation, obstipation, diarrhea  Neurological: Denies changes in sight, smell, hearing, taste, seizures. Denies loss of consciousness.   Psychiatric: Denies depression, sleep pattern changes, anxiety, change in personality  Endocrine: Denies

## 2023-12-06 DIAGNOSIS — I10 HTN (HYPERTENSION), BENIGN: ICD-10-CM

## 2023-12-07 RX ORDER — METOPROLOL SUCCINATE 100 MG/1
TABLET, EXTENDED RELEASE ORAL
Qty: 90 TABLET | Refills: 0 | Status: SHIPPED | OUTPATIENT
Start: 2023-12-07

## 2023-12-07 NOTE — TELEPHONE ENCOUNTER
PCP: ERIC Morales - NP    Last appt: 8/3/2023   No future appointments. Requested Prescriptions     Pending Prescriptions Disp Refills    metoprolol succinate (TOPROL XL) 100 MG extended release tablet [Pharmacy Med Name: Metoprolol Succinate  MG Oral Tablet Extended Release 24 Hour] 90 tablet 0     Sig: TAKE 1 TABLET BY MOUTH ONCE DAILY.  *NEED APPOINTMENT WITH NEW PROVIDER FOR MORE REFILLS*         Prior labs and Blood pressures:  BP Readings from Last 3 Encounters:   09/19/23 (!) 149/101   08/03/23 138/88   06/06/23 (!) 154/106     Lab Results   Component Value Date/Time     08/03/2023 10:02 AM    K 4.1 08/03/2023 10:02 AM     08/03/2023 10:02 AM    CO2 31 08/03/2023 10:02 AM    BUN 18 08/03/2023 10:02 AM    GFRAA >60 09/20/2022 08:42 AM     No results found for: \"HBA1C\", \"WQG7MRMD\"  Lab Results   Component Value Date/Time    CHOL 128 02/07/2023 02:48 PM    HDL 49 02/07/2023 02:48 PM    VLDL 16 10/23/2020 12:00 AM     No results found for: \"VITD3\", \"VD3RIA\"    No results found for: \"TSH\", \"TSH2\", \"TSH3\"

## 2024-01-11 ENCOUNTER — OFFICE VISIT (OUTPATIENT)
Age: 54
End: 2024-01-11

## 2024-01-11 VITALS
WEIGHT: 197.8 LBS | OXYGEN SATURATION: 99 % | BODY MASS INDEX: 31.79 KG/M2 | DIASTOLIC BLOOD PRESSURE: 71 MMHG | RESPIRATION RATE: 18 BRPM | SYSTOLIC BLOOD PRESSURE: 139 MMHG | HEART RATE: 76 BPM | HEIGHT: 66 IN | TEMPERATURE: 97.7 F

## 2024-01-11 DIAGNOSIS — M17.0 BILATERAL PRIMARY OSTEOARTHRITIS OF KNEE: Primary | ICD-10-CM

## 2024-01-11 RX ORDER — BETAMETHASONE SODIUM PHOSPHATE AND BETAMETHASONE ACETATE 3; 3 MG/ML; MG/ML
6 INJECTION, SUSPENSION INTRA-ARTICULAR; INTRALESIONAL; INTRAMUSCULAR; SOFT TISSUE ONCE
Status: COMPLETED | OUTPATIENT
Start: 2024-01-11 | End: 2024-01-11

## 2024-01-11 RX ADMIN — BETAMETHASONE SODIUM PHOSPHATE AND BETAMETHASONE ACETATE 6 MG: 3; 3 INJECTION, SUSPENSION INTRA-ARTICULAR; INTRALESIONAL; INTRAMUSCULAR; SOFT TISSUE at 10:59

## 2024-01-11 ASSESSMENT — PATIENT HEALTH QUESTIONNAIRE - PHQ9
1. LITTLE INTEREST OR PLEASURE IN DOING THINGS: 0
SUM OF ALL RESPONSES TO PHQ QUESTIONS 1-9: 0
SUM OF ALL RESPONSES TO PHQ QUESTIONS 1-9: 0
2. FEELING DOWN, DEPRESSED OR HOPELESS: 0
SUM OF ALL RESPONSES TO PHQ QUESTIONS 1-9: 0
SUM OF ALL RESPONSES TO PHQ9 QUESTIONS 1 & 2: 0
SUM OF ALL RESPONSES TO PHQ QUESTIONS 1-9: 0

## 2024-01-11 NOTE — PROGRESS NOTES
Date of Procedure: 1/11/2024  PROCEDURE NOTE: Right knee injection of Celestone    Consent was obtained from the patient. The correct site was identified after confirmation with the patient.  Following identification and confirmation of the correct site with the patient, the superolateral right knee was prepped in the normal sterile fashion.  A local anesthetic of 1% lidocaine without epinephrine was then administered to the local tissues.  Following, an injection of a mixture of  6 mg Celestone and 1% lidocaine without epinephrine was administered to the right knee.  The needle was then withdrawn and the injection site dressed with a sterile bandage at the conclusion.  The procedure was well tolerated by the patient.  No immediate adverse reactions were noted.  Post injection instructions were given.      Diagnosis: Right Knee Osteoarthritis

## 2024-01-11 NOTE — PROGRESS NOTES
Identified pt with two pt identifiers (name and ). Reviewed chart in preparation for visit and have obtained necessary documentation.    Sathish Lr is a 53 y.o. male Knee Pain (Follow up right knee pain)  .    Vitals:    24 1044   BP: 139/71   Site: Right Upper Arm   Position: Sitting   Cuff Size: Large Adult   Pulse: 76   Resp: 18   Temp: 97.7 °F (36.5 °C)   TempSrc: Temporal   SpO2: 99%   Weight: 89.7 kg (197 lb 12.8 oz)   Height: 1.676 m (5' 6\")          1. Have you been to the ER, urgent care clinic since your last visit?  Hospitalized since your last visit?  no     2. Have you seen or consulted any other health care providers outside of the [facility] since your last visit?  Include any pap smears or colon screening.  no

## 2024-02-29 ENCOUNTER — OFFICE VISIT (OUTPATIENT)
Age: 54
End: 2024-02-29
Payer: COMMERCIAL

## 2024-02-29 VITALS
HEIGHT: 66 IN | SYSTOLIC BLOOD PRESSURE: 137 MMHG | DIASTOLIC BLOOD PRESSURE: 80 MMHG | OXYGEN SATURATION: 99 % | HEART RATE: 89 BPM | BODY MASS INDEX: 31.34 KG/M2 | WEIGHT: 195 LBS | TEMPERATURE: 97.8 F | RESPIRATION RATE: 16 BRPM

## 2024-02-29 DIAGNOSIS — Z11.59 NEED FOR HEPATITIS B SCREENING TEST: ICD-10-CM

## 2024-02-29 DIAGNOSIS — E78.2 MIXED HYPERLIPIDEMIA: ICD-10-CM

## 2024-02-29 DIAGNOSIS — Z12.11 ENCOUNTER FOR SCREENING FOR MALIGNANT NEOPLASM OF COLON: ICD-10-CM

## 2024-02-29 DIAGNOSIS — E11.65 TYPE 2 DIABETES MELLITUS WITH HYPERGLYCEMIA, WITHOUT LONG-TERM CURRENT USE OF INSULIN (HCC): Primary | ICD-10-CM

## 2024-02-29 DIAGNOSIS — Z23 NEED FOR SHINGLES VACCINE: ICD-10-CM

## 2024-02-29 DIAGNOSIS — Z23 NEED FOR INFLUENZA VACCINATION: ICD-10-CM

## 2024-02-29 DIAGNOSIS — I10 HTN (HYPERTENSION), BENIGN: ICD-10-CM

## 2024-02-29 DIAGNOSIS — F41.9 ANXIETY: ICD-10-CM

## 2024-02-29 LAB
ALBUMIN SERPL-MCNC: 3.9 G/DL (ref 3.5–5)
ALBUMIN/GLOB SERPL: 1.1 (ref 1.1–2.2)
ALP SERPL-CCNC: 92 U/L (ref 45–117)
ALT SERPL-CCNC: 39 U/L (ref 12–78)
ANION GAP SERPL CALC-SCNC: 1 MMOL/L (ref 5–15)
AST SERPL-CCNC: 26 U/L (ref 15–37)
BILIRUB SERPL-MCNC: 0.4 MG/DL (ref 0.2–1)
BUN SERPL-MCNC: 27 MG/DL (ref 6–20)
BUN/CREAT SERPL: 25 (ref 12–20)
CALCIUM SERPL-MCNC: 9.1 MG/DL (ref 8.5–10.1)
CHLORIDE SERPL-SCNC: 107 MMOL/L (ref 97–108)
CHOLEST SERPL-MCNC: 142 MG/DL
CO2 SERPL-SCNC: 28 MMOL/L (ref 21–32)
CREAT SERPL-MCNC: 1.06 MG/DL (ref 0.7–1.3)
GLOBULIN SER CALC-MCNC: 3.4 G/DL (ref 2–4)
GLUCOSE SERPL-MCNC: 129 MG/DL (ref 65–100)
HBA1C MFR BLD: 6.4 %
HBV SURFACE AB SER QL: NONREACTIVE
HBV SURFACE AB SER-ACNC: 7.27 MIU/ML
HBV SURFACE AG SER QL: <0.1 INDEX
HBV SURFACE AG SER QL: NEGATIVE
HDLC SERPL-MCNC: 48 MG/DL
HDLC SERPL: 3 (ref 0–5)
LDLC SERPL CALC-MCNC: 76.4 MG/DL (ref 0–100)
POTASSIUM SERPL-SCNC: 4.2 MMOL/L (ref 3.5–5.1)
PROT SERPL-MCNC: 7.3 G/DL (ref 6.4–8.2)
SODIUM SERPL-SCNC: 136 MMOL/L (ref 136–145)
TRIGL SERPL-MCNC: 88 MG/DL
VLDLC SERPL CALC-MCNC: 17.6 MG/DL

## 2024-02-29 PROCEDURE — 90750 HZV VACC RECOMBINANT IM: CPT | Performed by: NURSE PRACTITIONER

## 2024-02-29 PROCEDURE — 99214 OFFICE O/P EST MOD 30 MIN: CPT | Performed by: NURSE PRACTITIONER

## 2024-02-29 PROCEDURE — 3079F DIAST BP 80-89 MM HG: CPT | Performed by: NURSE PRACTITIONER

## 2024-02-29 PROCEDURE — 90674 CCIIV4 VAC NO PRSV 0.5 ML IM: CPT | Performed by: NURSE PRACTITIONER

## 2024-02-29 PROCEDURE — 90471 IMMUNIZATION ADMIN: CPT | Performed by: NURSE PRACTITIONER

## 2024-02-29 PROCEDURE — 83036 HEMOGLOBIN GLYCOSYLATED A1C: CPT | Performed by: NURSE PRACTITIONER

## 2024-02-29 PROCEDURE — 3075F SYST BP GE 130 - 139MM HG: CPT | Performed by: NURSE PRACTITIONER

## 2024-02-29 PROCEDURE — 90472 IMMUNIZATION ADMIN EACH ADD: CPT | Performed by: NURSE PRACTITIONER

## 2024-02-29 RX ORDER — THIAMINE HCL 100 MG
TABLET ORAL
COMMUNITY

## 2024-02-29 RX ORDER — DULOXETIN HYDROCHLORIDE 60 MG/1
60 CAPSULE, DELAYED RELEASE ORAL DAILY
Qty: 90 CAPSULE | Refills: 1 | Status: SHIPPED | OUTPATIENT
Start: 2024-02-29

## 2024-02-29 RX ORDER — OMEPRAZOLE 20 MG/1
TABLET, DELAYED RELEASE ORAL
COMMUNITY

## 2024-02-29 RX ORDER — METFORMIN HYDROCHLORIDE 500 MG/1
500 TABLET, EXTENDED RELEASE ORAL
Qty: 90 TABLET | Refills: 1 | Status: SHIPPED | OUTPATIENT
Start: 2024-02-29

## 2024-02-29 RX ORDER — LISINOPRIL 20 MG/1
20 TABLET ORAL DAILY
Qty: 90 TABLET | Refills: 1 | Status: SHIPPED | OUTPATIENT
Start: 2024-02-29

## 2024-02-29 RX ORDER — ATORVASTATIN CALCIUM 40 MG/1
40 TABLET, FILM COATED ORAL DAILY
Qty: 90 TABLET | Refills: 1 | Status: SHIPPED | OUTPATIENT
Start: 2024-02-29

## 2024-02-29 RX ORDER — METOPROLOL SUCCINATE 100 MG/1
100 TABLET, EXTENDED RELEASE ORAL DAILY
Qty: 90 TABLET | Refills: 1 | Status: SHIPPED | OUTPATIENT
Start: 2024-02-29

## 2024-02-29 NOTE — PROGRESS NOTES
Chief Complaint   Patient presents with    Diabetes    Hypertension    Hyperlipidemia       \"Have you been to the ER, urgent care clinic since your last visit?  Hospitalized since your last visit?\"    NO    “Have you seen or consulted any other health care providers outside of [facility] since your last visit?”    NO             1/11/2024    10:44 AM   PHQ-9    Little interest or pleasure in doing things 0   Feeling down, depressed, or hopeless 0   PHQ-2 Score 0   PHQ-9 Total Score 0       Health Maintenance Due   Topic Date Due    Hepatitis B vaccine (1 of 3 - 3-dose series) Never done    Pneumococcal 0-64 years Vaccine (2 - PCV) 10/21/2021    Flu vaccine (1) 08/01/2023    COVID-19 Vaccine (3 - 2023-24 season) 09/01/2023    Shingles vaccine (2 of 2) 09/28/2023    Lipids  02/07/2024       
distress. Clear to auscultation bilaterally.   Cardiovascular: Regular rate and rhythm, S1, S2 normal, no murmur, click, rub or gallop.   Extremities: no edema.   Abdomen: Soft, non-tender, not distended. Bowel sounds normal. No masses or organomegaly.  MSK: Extremities normal appearing, atraumatic, no effusion. Gait steady and unassisted.   Skin: Skin color, texture, turgor normal. No rashes or lesions on exposed skin.  Neurologic: A/Ox3  Psychiatric: Normal affect. Mood euthymic. Thoughts logical. Speech volume and speed normal            Treatment risks/benefits/costs/interactions/alternatives discussed with patient.  Advised patient to call back or return to office if symptoms worsen/change/persist. If patient cannot reach us or should anything more severe/urgent arise he/she should proceed directly to the nearest emergency department.  Discussed expected course/resolution/complications of diagnosis in detail with patient.  Patient expressed understanding with the diagnosis and plan.     An electronic signature was used to authenticate this note.  -- EIRC Cobos - NP

## 2024-03-02 LAB — HBV CORE AB SERPL QL IA: NEGATIVE

## 2024-03-04 ENCOUNTER — TELEPHONE (OUTPATIENT)
Age: 54
End: 2024-03-04

## 2024-03-04 NOTE — TELEPHONE ENCOUNTER
Attempted to contact patient and schedule a consultation with Dr. Lynn for a colonoscopy. Patient referred by Shi Duffy NP. No answer, unable to leave voicemail.

## 2024-03-06 ENCOUNTER — OFFICE VISIT (OUTPATIENT)
Age: 54
End: 2024-03-06

## 2024-03-06 VITALS
WEIGHT: 194.9 LBS | OXYGEN SATURATION: 98 % | SYSTOLIC BLOOD PRESSURE: 143 MMHG | BODY MASS INDEX: 31.46 KG/M2 | HEART RATE: 105 BPM | RESPIRATION RATE: 18 BRPM | TEMPERATURE: 98.3 F | DIASTOLIC BLOOD PRESSURE: 91 MMHG

## 2024-03-06 DIAGNOSIS — T46.4X5A ANGIOTENSIN CONVERTING ENZYME INHIBITOR-AGGRAVATED ANGIOEDEMA, INITIAL ENCOUNTER: Primary | ICD-10-CM

## 2024-03-06 DIAGNOSIS — T78.3XXA ANGIOTENSIN CONVERTING ENZYME INHIBITOR-AGGRAVATED ANGIOEDEMA, INITIAL ENCOUNTER: Primary | ICD-10-CM

## 2024-03-06 RX ORDER — CETIRIZINE HYDROCHLORIDE 10 MG/1
10 TABLET ORAL DAILY
Qty: 30 TABLET | Refills: 0 | Status: SHIPPED | OUTPATIENT
Start: 2024-03-06

## 2024-03-06 RX ORDER — PREDNISONE 5 MG/1
TABLET ORAL
Qty: 1 EACH | Refills: 0 | Status: SHIPPED | OUTPATIENT
Start: 2024-03-06

## 2024-03-06 NOTE — PROGRESS NOTES
midline.  Neck/Lymphatics - trachea midline, full AROM, no LAD of neck  Chest - Normal breathing effort no wheeze rales, rhonchi or diminishments bilaterally.  Heart - RRR, no murmurs  Skin - no observable rashes or pallor  Neurologic- alert and oriented x 3  Psychiatric- normal mood, behavior and though content.      Assessment/ Plan:     1. Angiotensin converting enzyme inhibitor-aggravated angioedema, initial encounter  -     predniSONE 5 MG (21) TBPK; Take 6 tabs by mouth on day 1. then 5 tabs on day 2. then 4 tabs on day 3. then 3 tabs on day 4. Then 2 tabs on day 5. Then 1 tab on day 6. Take with food., Disp-1 each, R-0Normal  -     cetirizine (ZYRTEC) 10 MG tablet; Take 1 tablet by mouth daily, Disp-30 tablet, R-0Normal       Angioedema likely from Lisinopril  Ddx other allergic reaction  Stop lisinopril. Med allergy updated.'  Start zyrtec daily per orders  Will treat with prednisone per orders  Advised to contact Primary Care Provider today to discuss alternative BP med.   I have advised he go immediately to ER/ED/call 911 for throat closing sensation, trouble breathing, dizziness or worsening symptmos.        Follow up:  Follow up immediately for any new, worsening or changes or if symptoms are not improving over the next 5-7 days.         Sen Romero, ERIC - NP

## 2024-03-13 ENCOUNTER — TELEPHONE (OUTPATIENT)
Age: 54
End: 2024-03-13

## 2024-03-13 NOTE — TELEPHONE ENCOUNTER
Patient started the lisinopril and had a bad reaction and went to an urgent care and was given a medication to help him, but patient is asking for PCP to send in the 2nd medication they talked about.

## 2024-03-22 ENCOUNTER — OFFICE VISIT (OUTPATIENT)
Age: 54
End: 2024-03-22
Payer: COMMERCIAL

## 2024-03-22 VITALS
BODY MASS INDEX: 31.79 KG/M2 | WEIGHT: 197.8 LBS | DIASTOLIC BLOOD PRESSURE: 86 MMHG | OXYGEN SATURATION: 99 % | HEIGHT: 66 IN | HEART RATE: 77 BPM | TEMPERATURE: 97.9 F | SYSTOLIC BLOOD PRESSURE: 130 MMHG | RESPIRATION RATE: 16 BRPM

## 2024-03-22 DIAGNOSIS — T46.4X5D ADVERSE EFFECT OF ANGIOTENSIN-CONVERTING ENZYME INHIBITOR, SUBSEQUENT ENCOUNTER: Primary | ICD-10-CM

## 2024-03-22 DIAGNOSIS — E11.65 TYPE 2 DIABETES MELLITUS WITH HYPERGLYCEMIA, WITHOUT LONG-TERM CURRENT USE OF INSULIN (HCC): ICD-10-CM

## 2024-03-22 DIAGNOSIS — I10 HTN (HYPERTENSION), BENIGN: ICD-10-CM

## 2024-03-22 PROCEDURE — 99214 OFFICE O/P EST MOD 30 MIN: CPT | Performed by: NURSE PRACTITIONER

## 2024-03-22 PROCEDURE — 3079F DIAST BP 80-89 MM HG: CPT | Performed by: NURSE PRACTITIONER

## 2024-03-22 PROCEDURE — 3075F SYST BP GE 130 - 139MM HG: CPT | Performed by: NURSE PRACTITIONER

## 2024-03-22 RX ORDER — LOSARTAN POTASSIUM 25 MG/1
25 TABLET ORAL DAILY
Qty: 7 TABLET | Refills: 0 | Status: SHIPPED | OUTPATIENT
Start: 2024-03-22

## 2024-03-22 NOTE — PROGRESS NOTES
[facility]  Clinic Note     Sathish Lr (: 1970) is a 53 y.o. male, established patient, here for evaluation of the following chief complaint(s):  ED Follow-up (3/6/24 at [facility] for med reaction)       ASSESSMENT/PLAN:  1. Adverse effect of angiotensin-converting enzyme inhibitor, subsequent encounter  -Urgent care documentation reviewed.  Discussed with patient.  - Lisinopril discontinued  -Completed steroid taper.  No further symptoms.    2. Type 2 diabetes mellitus with hyperglycemia, without long-term current use of insulin (HCC)  -Reaction to lisinopril.  Will trial dose of losartan.  At this time we will send a 1 week supply to assess tolerability.  We discussed potential angioedema or other reaction with ARB medication.  Patient will monitor this closely.  Counseled regarding symptoms of emergent care.  -     losartan (COZAAR) 25 MG tablet; Take 1 tablet by mouth daily, Disp-7 tablet, R-0Medication trialNormal  -Repeat BMP at follow-up  -A1c 6.4 on 2024.    3. HTN (hypertension), benign  -Currently controlled.  -     losartan (COZAAR) 25 MG tablet; Take 1 tablet by mouth daily, Disp-7 tablet, R-0Medication trialNormal  -Plan per above #2        Return in about 4 weeks (around 2024).        SUBJECTIVE/OBJECTIVE:    Sathish Lr is a 53 y.o. male seen today for urgent care follow up.  Patient has been prescribed low-dose lisinopril due to underlying diabetes.  He took 1 dose and began to feel itching.  He will the following day with lower extremity swelling.  He was evaluated and informed to discontinue lisinopril and placed on steroid taper.  Mr. Lr is doing well and is continuing to take since his encounter.  No further complications.          REVIEW OF SYSTEMS:    Review of Systems   All other systems reviewed and are negative.        VITAL SIGNS:    Wt Readings from Last 3 Encounters:   24 89.7 kg (197 lb 12.8 oz)   24 88.4 kg (194 lb 14.4 oz)   24

## 2024-03-22 NOTE — PROGRESS NOTES
Chief Complaint   Patient presents with    ED Follow-up     3/6/24 at [facility] for med reaction       \"Have you been to the ER, urgent care clinic since your last visit?  Hospitalized since your last visit?\"    YES - When: approximately 2  weeks ago.  Where and Why: above.    “Have you seen or consulted any other health care providers outside of [facility] since your last visit?”    NO             1/11/2024    10:44 AM   PHQ-9    Little interest or pleasure in doing things 0   Feeling down, depressed, or hopeless 0   PHQ-2 Score 0   PHQ-9 Total Score 0       Health Maintenance Due   Topic Date Due    Hepatitis B vaccine (1 of 3 - 3-dose series) Never done    Pneumococcal 0-64 years Vaccine (2 of 2 - PCV) 10/21/2021    COVID-19 Vaccine (3 - 2023-24 season) 09/01/2023

## 2024-03-27 DIAGNOSIS — E11.65 TYPE 2 DIABETES MELLITUS WITH HYPERGLYCEMIA, WITHOUT LONG-TERM CURRENT USE OF INSULIN (HCC): ICD-10-CM

## 2024-03-27 DIAGNOSIS — I10 HTN (HYPERTENSION), BENIGN: ICD-10-CM

## 2024-03-27 NOTE — TELEPHONE ENCOUNTER
NP Clive,    Patient call stating is tolerating the losartan ok and needs a refill sent to [facility].  (Had only received 7 (1 week supply to try).  Thanks, Deja    Last appointment: 3/22/24 Clive  Next appointment: 4/19/24 Clive  Previous refill encounter(s): 3/22/24 7    Requested Prescriptions     Pending Prescriptions Disp Refills    losartan (COZAAR) 25 MG tablet 30 tablet 0     Sig: Take 1 tablet by mouth daily     For Pharmacy Admin Tracking Only    Program: Medication Refill  CPA in place:    Recommendation Provided To:   Intervention Detail: New Rx: 1, reason: Patient Preference  Intervention Accepted By:   Gap Closed?:    Time Spent (min): 5

## 2024-03-28 ENCOUNTER — OFFICE VISIT (OUTPATIENT)
Age: 54
End: 2024-03-28
Payer: COMMERCIAL

## 2024-03-28 VITALS
WEIGHT: 198.8 LBS | HEIGHT: 66 IN | RESPIRATION RATE: 19 BRPM | HEART RATE: 88 BPM | DIASTOLIC BLOOD PRESSURE: 80 MMHG | OXYGEN SATURATION: 93 % | BODY MASS INDEX: 31.95 KG/M2 | SYSTOLIC BLOOD PRESSURE: 129 MMHG | TEMPERATURE: 98.4 F

## 2024-03-28 DIAGNOSIS — E11.65 TYPE 2 DIABETES MELLITUS WITH HYPERGLYCEMIA, WITHOUT LONG-TERM CURRENT USE OF INSULIN (HCC): Primary | ICD-10-CM

## 2024-03-28 DIAGNOSIS — E11.65 TYPE 2 DIABETES MELLITUS WITH HYPERGLYCEMIA, WITHOUT LONG-TERM CURRENT USE OF INSULIN (HCC): ICD-10-CM

## 2024-03-28 DIAGNOSIS — Z12.11 SCREENING FOR COLON CANCER: ICD-10-CM

## 2024-03-28 DIAGNOSIS — K21.9 GASTROESOPHAGEAL REFLUX DISEASE WITHOUT ESOPHAGITIS: ICD-10-CM

## 2024-03-28 DIAGNOSIS — I10 HTN (HYPERTENSION), BENIGN: ICD-10-CM

## 2024-03-28 PROCEDURE — 99204 OFFICE O/P NEW MOD 45 MIN: CPT | Performed by: SURGERY

## 2024-03-28 PROCEDURE — 3074F SYST BP LT 130 MM HG: CPT | Performed by: SURGERY

## 2024-03-28 PROCEDURE — 3079F DIAST BP 80-89 MM HG: CPT | Performed by: SURGERY

## 2024-03-28 RX ORDER — SODIUM, POTASSIUM,MAG SULFATES 17.5-3.13G
1 SOLUTION, RECONSTITUTED, ORAL ORAL ONCE
Qty: 1 EACH | Refills: 0 | Status: SHIPPED | OUTPATIENT
Start: 2024-03-28 | End: 2024-03-28

## 2024-03-28 ASSESSMENT — PATIENT HEALTH QUESTIONNAIRE - PHQ9
SUM OF ALL RESPONSES TO PHQ9 QUESTIONS 1 & 2: 0
SUM OF ALL RESPONSES TO PHQ QUESTIONS 1-9: 0
SUM OF ALL RESPONSES TO PHQ QUESTIONS 1-9: 0
1. LITTLE INTEREST OR PLEASURE IN DOING THINGS: NOT AT ALL
SUM OF ALL RESPONSES TO PHQ QUESTIONS 1-9: 0
2. FEELING DOWN, DEPRESSED OR HOPELESS: NOT AT ALL
SUM OF ALL RESPONSES TO PHQ QUESTIONS 1-9: 0

## 2024-03-28 ASSESSMENT — ENCOUNTER SYMPTOMS
ABDOMINAL DISTENTION: 0
NAUSEA: 0
BLOOD IN STOOL: 0
ABDOMINAL PAIN: 0
DIARRHEA: 0
CONSTIPATION: 0
VOMITING: 0

## 2024-03-28 NOTE — ASSESSMENT & PLAN NOTE
Well controlled with prilosec.   Conservative measures including:      Apple cidar vinegar 1 tsp twice daily      Aloe vera juice 6 oz 1-2 times daily      Avoid spicy foods      Avoid acidic foods tomato products, etc.      Avoid eating within 2 hours of bedtime      Avoid laying down or reclining for 2 hours after meals.

## 2024-03-28 NOTE — PROGRESS NOTES
Identified pt with two pt identifiers (name and ). Reviewed chart in preparation for visit and have obtained necessary documentation.    Sathish Lr is a 53 y.o. male  Chief Complaint   Patient presents with    New Patient     Screening for malignant neoplasm     /80 (Site: Right Upper Arm, Position: Sitting, Cuff Size: Small Adult)   Pulse 88   Temp 98.4 °F (36.9 °C) (Oral)   Resp 19   Ht 1.676 m (5' 6\")   Wt 90.2 kg (198 lb 12.8 oz)   SpO2 93%   BMI 32.09 kg/m²     1. Have you been to the ER, urgent care clinic since your last visit?  Hospitalized since your last visit?yes - urgent care for allergic reaction    2. Have you seen or consulted any other health care providers outside of the [facility] since your last visit?  Include any pap smears or colon screening. no  
oriented to person, place, and time.   Psychiatric:         Mood and Affect: Mood normal.         Behavior: Behavior normal.         Thought Content: Thought content normal.         Judgment: Judgment normal.           Problem List Items Addressed This Visit          Digestive    Gastroesophageal reflux disease without esophagitis     Well controlled with prilosec.   Conservative measures including:      Apple cidar vinegar 1 tsp twice daily      Aloe vera juice 6 oz 1-2 times daily      Avoid spicy foods      Avoid acidic foods tomato products, etc.      Avoid eating within 2 hours of bedtime      Avoid laying down or reclining for 2 hours after meals.            Relevant Medications    sodium-potassium-mag sulfate (SUPREP) 17.5-3.13-1.6 GM/177ML SOLN solution       Endocrine    Type 2 diabetes mellitus with hyperglycemia, without long-term current use of insulin ([facility]) - Primary     Fairly well controlled, will have sugary drink available during prep just in case.             Other    Screening for colon cancer     Needs screening colonoscopy.   I discussed the intended procedure as well as alternative treatments including doing nothing.  I discussed the risks of the procedure including but not limited to bleeding, perforation, aspiration, and damage to surrounding structures. I answered all questions related to the procedure.  Understanding was verbalized and we will proceed as planned.                   I, Coleman Lynn Jr., MD personally performed the services described in this document.   This documentation was facilitated by voice recognition software and may contain inadvertent typographical errors.  If there are substantial concerns about the content of this note that may affect patient care, please contact me for clarification.

## 2024-03-28 NOTE — ASSESSMENT & PLAN NOTE
Needs screening colonoscopy.   I discussed the intended procedure as well as alternative treatments including doing nothing.  I discussed the risks of the procedure including but not limited to bleeding, perforation, aspiration, and damage to surrounding structures. I answered all questions related to the procedure.  Understanding was verbalized and we will proceed as planned.

## 2024-03-29 RX ORDER — LOSARTAN POTASSIUM 25 MG/1
25 TABLET ORAL DAILY
Qty: 7 TABLET | Refills: 0 | OUTPATIENT
Start: 2024-03-29

## 2024-03-29 RX ORDER — LOSARTAN POTASSIUM 25 MG/1
25 TABLET ORAL DAILY
Qty: 30 TABLET | Refills: 0 | Status: SHIPPED | OUTPATIENT
Start: 2024-03-29

## 2024-04-27 PROBLEM — Z12.11 SCREENING FOR COLON CANCER: Status: RESOLVED | Noted: 2024-03-28 | Resolved: 2024-04-27

## 2024-05-01 ENCOUNTER — OFFICE VISIT (OUTPATIENT)
Age: 54
End: 2024-05-01
Payer: COMMERCIAL

## 2024-05-01 VITALS
HEIGHT: 66 IN | SYSTOLIC BLOOD PRESSURE: 128 MMHG | OXYGEN SATURATION: 98 % | DIASTOLIC BLOOD PRESSURE: 88 MMHG | BODY MASS INDEX: 31.12 KG/M2 | HEART RATE: 80 BPM | RESPIRATION RATE: 16 BRPM | WEIGHT: 193.6 LBS | TEMPERATURE: 98.1 F

## 2024-05-01 DIAGNOSIS — T46.4X5D ADVERSE EFFECT OF ANGIOTENSIN-CONVERTING ENZYME INHIBITOR, SUBSEQUENT ENCOUNTER: ICD-10-CM

## 2024-05-01 DIAGNOSIS — Z23 NEED FOR PROPHYLACTIC VACCINATION AGAINST HEPATITIS A AND HEPATITIS B: ICD-10-CM

## 2024-05-01 DIAGNOSIS — E78.2 MIXED HYPERLIPIDEMIA: ICD-10-CM

## 2024-05-01 DIAGNOSIS — I10 ESSENTIAL HYPERTENSION: ICD-10-CM

## 2024-05-01 DIAGNOSIS — E11.65 TYPE 2 DIABETES MELLITUS WITH HYPERGLYCEMIA, WITHOUT LONG-TERM CURRENT USE OF INSULIN (HCC): Primary | ICD-10-CM

## 2024-05-01 LAB
ANION GAP SERPL CALC-SCNC: 8 MMOL/L (ref 5–15)
BUN SERPL-MCNC: 18 MG/DL (ref 6–20)
BUN/CREAT SERPL: 18 (ref 12–20)
CALCIUM SERPL-MCNC: 9.7 MG/DL (ref 8.5–10.1)
CHLORIDE SERPL-SCNC: 105 MMOL/L (ref 97–108)
CO2 SERPL-SCNC: 27 MMOL/L (ref 21–32)
CREAT SERPL-MCNC: 1 MG/DL (ref 0.7–1.3)
GLUCOSE SERPL-MCNC: 114 MG/DL (ref 65–100)
POTASSIUM SERPL-SCNC: 4.2 MMOL/L (ref 3.5–5.1)
SODIUM SERPL-SCNC: 140 MMOL/L (ref 136–145)

## 2024-05-01 PROCEDURE — 99214 OFFICE O/P EST MOD 30 MIN: CPT | Performed by: NURSE PRACTITIONER

## 2024-05-01 PROCEDURE — 90471 IMMUNIZATION ADMIN: CPT | Performed by: NURSE PRACTITIONER

## 2024-05-01 PROCEDURE — 3074F SYST BP LT 130 MM HG: CPT | Performed by: NURSE PRACTITIONER

## 2024-05-01 PROCEDURE — 90636 HEP A/HEP B VACC ADULT IM: CPT | Performed by: NURSE PRACTITIONER

## 2024-05-01 PROCEDURE — 3079F DIAST BP 80-89 MM HG: CPT | Performed by: NURSE PRACTITIONER

## 2024-05-01 RX ORDER — LOSARTAN POTASSIUM 50 MG/1
50 TABLET ORAL DAILY
Qty: 90 TABLET | Refills: 1 | Status: SHIPPED | OUTPATIENT
Start: 2024-05-01

## 2024-05-01 NOTE — PROGRESS NOTES
Chief Complaint   Patient presents with    Diabetes    Hypertension       \"Have you been to the ER, urgent care clinic since your last visit?  Hospitalized since your last visit?\"    NO    “Have you seen or consulted any other health care providers outside of [facility] since your last visit?”    NO      Click Here for Release of Records Request          3/28/2024     9:23 AM   PHQ-9    Little interest or pleasure in doing things 0   Feeling down, depressed, or hopeless 0   PHQ-2 Score 0   PHQ-9 Total Score 0       Health Maintenance Due   Topic Date Due    Hepatitis B vaccine (1 of 3 - 3-dose series) Never done    Pneumococcal 0-64 years Vaccine (2 of 2 - PCV) 10/21/2021    COVID-19 Vaccine (3 - 2023-24 season) 09/01/2023

## 2024-05-01 NOTE — PROGRESS NOTES
[facility]  Clinic Note     Sathish Lr (: 1970) is a 53 y.o. male, established patient, here for evaluation of the following chief complaint(s):  Diabetes and Hypertension       ASSESSMENT/PLAN:    1. Type 2 diabetes mellitus with hyperglycemia, without long-term current use of insulin (HCC)  -Controlled  -     losartan (COZAAR) 50 MG tablet; Take 1 tablet by mouth daily, Disp-90 tablet, R-1Medication Normal  -     Basic Metabolic Panel; Future  - Last A1c  6.4    2. Mixed hyperlipidemia   - Continue atorvastatin    Lab Results   Component Value Date    CHOL 142 2024    TRIG 88 2024    HDL 48 2024    VLDL 16 10/23/2020    CHOLHDLRATIO 3.0 2024       3. Adverse effect of angiotensin-converting enzyme inhibitor, subsequent encounter  - tolerating ARB, Losartan.No swelling    4. Essential hypertension  - Improving, borderline BP today  -   INCREASE to   losartan (COZAAR) 50 MG tablet; Take 1 tablet by mouth daily, Disp-90 tablet, R-1Medication trialNormal  -     Basic Metabolic Panel; Future to re-eval kidney function and potassium levels after med change  The patient's losartan dosage will be escalated to 50 mg daily, with a prescription for a 90-day supply with refills. A metabolic panel will be conducted today. Should the patient's blood pressure remain stable, a follow-up appointment will be scheduled in 6 months. However, should the patient's blood pressure begin to rise during spot-monitoring checks, a return visit will be necessary to discuss potential medication adjustments.    5. Need for prophylactic vaccination against hepatitis A and hepatitis B  -     SD IM ADM PRQ ID SUBQ/IM NJXS 1 VACCINE  -     Hep A-Hep B, TWINRIX, (age 18 yrs+), IM              Return for 2 months -1 Hep B, 2-5 months later repeat twinrix.              SUBJECTIVE/OBJECTIVE:    History of Present Illness  Sathish Lr is a 53-year-old male who presents for evaluation of HTN,

## 2024-05-02 ENCOUNTER — OFFICE VISIT (OUTPATIENT)
Age: 54
End: 2024-05-02
Payer: COMMERCIAL

## 2024-05-02 DIAGNOSIS — S46.212A RUPTURE OF LEFT DISTAL BICEPS TENDON, INITIAL ENCOUNTER: Primary | ICD-10-CM

## 2024-05-02 PROCEDURE — 99213 OFFICE O/P EST LOW 20 MIN: CPT | Performed by: ORTHOPAEDIC SURGERY

## 2024-05-02 SDOH — HEALTH STABILITY: PHYSICAL HEALTH: ON AVERAGE, HOW MANY DAYS PER WEEK DO YOU ENGAGE IN MODERATE TO STRENUOUS EXERCISE (LIKE A BRISK WALK)?: 5 DAYS

## 2024-05-02 SDOH — HEALTH STABILITY: PHYSICAL HEALTH: ON AVERAGE, HOW MANY MINUTES DO YOU ENGAGE IN EXERCISE AT THIS LEVEL?: 150+ MIN

## 2024-05-02 NOTE — PROGRESS NOTES
5/2/2024      CC: Left arm pain    HPI:      This is a 53 y.o. year old male who is left-hand dominant, he had a moment where he hyperextended his left arm, he felt a pop in his left elbow, he has had progressive pain and swelling in his elbow area with a deformity with his biceps.  No management so far.      PMH:  Past Medical History:   Diagnosis Date    Anxiety disorder     Cataracts, both eyes     Diabetes (HCC)     Hypercholesterolemia     Hypertension     Osteoarthritis 2023    [facility] urgent care    Sleep apnea 11/2015    Type 2 diabetes mellitus without complication (HCC)        PSxHx:  Past Surgical History:   Procedure Laterality Date    EYE SURGERY      intraocular lens replacement    FRACTURE SURGERY      Multiple surgeries    HEENT Bilateral 11/2017    cataracts in both eyes.       Meds:    Current Outpatient Medications:     losartan (COZAAR) 50 MG tablet, Take 1 tablet by mouth daily, Disp: 90 tablet, Rfl: 1    omeprazole (PRILOSEC OTC) 20 MG tablet, , Disp: , Rfl:     MAGNESIUM PO, , Disp: , Rfl:     vitamin B-1 (THIAMINE) 100 MG TABS tablet, , Disp: , Rfl:     atorvastatin (LIPITOR) 40 MG tablet, Take 1 tablet by mouth daily, Disp: 90 tablet, Rfl: 1    DULoxetine (CYMBALTA) 60 MG extended release capsule, Take 1 capsule by mouth daily, Disp: 90 capsule, Rfl: 1    metFORMIN (GLUCOPHAGE-XR) 500 MG extended release tablet, Take 1 tablet by mouth daily (with breakfast), Disp: 90 tablet, Rfl: 1    metoprolol succinate (TOPROL XL) 100 MG extended release tablet, Take 1 tablet by mouth daily, Disp: 90 tablet, Rfl: 1    aspirin 81 MG EC tablet, Take 1 tablet by mouth daily, Disp: , Rfl:     vitamin D (CHOLECALCIFEROL) 25 MCG (1000 UT) TABS tablet, Take 1 tablet by mouth daily, Disp: , Rfl:     All:  Allergies   Allergen Reactions    Lisinopril Angioedema       Social Hx:  Social History     Socioeconomic History    Marital status: Single   Tobacco Use    Smoking status: Never    Smokeless tobacco: Never 
no

## 2024-05-05 ENCOUNTER — HOSPITAL ENCOUNTER (OUTPATIENT)
Facility: HOSPITAL | Age: 54
Discharge: HOME | End: 2024-05-08

## 2024-05-05 DIAGNOSIS — S46.212A RUPTURE OF LEFT DISTAL BICEPS TENDON, INITIAL ENCOUNTER: ICD-10-CM

## 2024-05-06 ENCOUNTER — TELEPHONE (OUTPATIENT)
Age: 54
End: 2024-05-06

## 2024-05-06 DIAGNOSIS — S46.212A RUPTURE OF LEFT DISTAL BICEPS TENDON, INITIAL ENCOUNTER: Primary | ICD-10-CM

## 2024-05-06 RX ORDER — DIAZEPAM 5 MG/1
5 TABLET ORAL ONCE
Qty: 2 TABLET | Refills: 0 | Status: SHIPPED | OUTPATIENT
Start: 2024-05-06 | End: 2024-05-06

## 2024-05-06 NOTE — TELEPHONE ENCOUNTER
Patient called in stating that he was unable to complete his MRI of his RT left distal biceps tendon due to an involuntary twitching. He is wanting to know if he may be prescribed a muscle relaxer or sedative so he may attempt to try the MRI again or if there is another imaging option available. Patient may be reached at [phone].

## 2024-05-06 NOTE — TELEPHONE ENCOUNTER
Patient called and stated that he is going to take his self off of the Symbalta. He stated that he is having tremors and he think it is coming from the medication. He states can you call him back.    Call back at [phone]

## 2024-05-06 NOTE — TELEPHONE ENCOUNTER
Called patient. 2 patient identifiers used to identify patient (name and ). Patient was informed that the doctor had sent in a prescription for Valium to his pharmacy in order to complete MRI and also advised patient that he would need someone to drive him to his appointment on that day, as medication may cause him to be drowsy. Patient voiced understanding and will get daughter to drive him to his appointment.

## 2024-05-07 NOTE — TELEPHONE ENCOUNTER
Called patient. Two patient identifiers verified. Pt said he went for an MRI of his arm on 5/5 and he could not keep still for it. They tried scanning 4-5 times and were unable to get image. Pt said this was the first time he noticed it, his hand and shoulder twitch involuntarily. Pt did research on duloxetine and thought this might be causing it since he is on such a high dose. He took half dose today. He said he would like to try Prozac as he has a lot of anxiety. Advised pt an appt would be required to discuss starting new medication. Scheduled pt for VV 5/9/2024.

## 2024-05-08 ENCOUNTER — HOSPITAL ENCOUNTER (OUTPATIENT)
Facility: HOSPITAL | Age: 54
Discharge: HOME | End: 2024-05-11
Payer: COMMERCIAL

## 2024-05-08 PROCEDURE — 73221 MRI JOINT UPR EXTREM W/O DYE: CPT

## 2024-05-09 ENCOUNTER — TELEMEDICINE (OUTPATIENT)
Age: 54
End: 2024-05-09
Payer: COMMERCIAL

## 2024-05-09 DIAGNOSIS — M79.602 LEFT ARM PAIN: ICD-10-CM

## 2024-05-09 DIAGNOSIS — F41.9 ANXIETY: Primary | ICD-10-CM

## 2024-05-09 PROCEDURE — 99213 OFFICE O/P EST LOW 20 MIN: CPT | Performed by: NURSE PRACTITIONER

## 2024-05-09 NOTE — PROGRESS NOTES
[facility]  Virtual Clinic Note    Sathish Lr (:  1970) is a Established patient, presenting virtually for evaluation of the following:      ASSESSMENT & PLAN:    1. Anxiety  -As Mr. Lr has been on Cymbalta for quite some time. We discussed a slow weaning process with the potential to cross taper to fluoxetine.  He abruptly decreased from 60 mg to 30 mg approximately 3 days ago.  To date he has not noticed any side effects.  Recommended alternating 60 and 30 mg every other day for the next 2 weeks before dropping down to 30 mg daily.  We will check in in 1 month to see how he is tolerating the weaning process    2. Left arm pain  -Met with Dr. Baptiste/Ortho-MRI of the left elbow completed: Full-thickness tear of the distal biceps tendon with significant retraction measuring 8.0 cm.  This is above the level of the left ear as for the process.  The tendon has a retracted wavy appearance.  There is fluid and edema stretching from the radial tuberosity to the retracted tendon.  -Awaiting next steps from Ortho.        Return in about 4 weeks (around 2024) for medication follow up for cymbalta.           SUBJECTIVE:    Sathish Lr is seen today for   Chief Complaint   Patient presents with    Medication Problem       Taking Cymbalta for many years. Did not notice a difference when using sertraline, celexa and fluoxetine. Paxil seemed to be \"too strong\". Experienced involuntary twitches during a recent MRI. Feels that Cymbalta may be contributing to it. Self reduced Cymbalta to 30 mg on Monday, .  Today he has not noticed any side effects.  Expresses interest in starting Prozac this summer.    Saw Ortho for left arm pain in which an MRI was ordered and completed.      REVIEW OF SYSYEMS:  Review of Systems   Musculoskeletal:         Left arm pain   Psychiatric/Behavioral:  The patient is nervous/anxious.    All other systems reviewed and are negative.

## 2024-05-13 ENCOUNTER — TELEPHONE (OUTPATIENT)
Age: 54
End: 2024-05-13

## 2024-05-13 ENCOUNTER — TELEMEDICINE (OUTPATIENT)
Age: 54
End: 2024-05-13
Payer: COMMERCIAL

## 2024-05-13 DIAGNOSIS — S46.212A RUPTURE OF LEFT DISTAL BICEPS TENDON, INITIAL ENCOUNTER: Primary | ICD-10-CM

## 2024-05-13 PROCEDURE — 99441 PR PHYS/QHP TELEPHONE EVALUATION 5-10 MIN: CPT | Performed by: ORTHOPAEDIC SURGERY

## 2024-05-13 NOTE — PROGRESS NOTES
Documentation:  I communicated with the patient and/or health care decision maker about his left arm MRI.   Details of this discussion including any medical advice provided: I did review his MRI, he does have a distal biceps tendon rupture with retraction.  Based on the fact that he is left-hand dominant, has significant dysfunction and deformity, he would like to proceed with surgical repair.We did discuss the risks of surgery which include but are not limited to infection, nerve or blood vessel damage, failure of fixation, failure of any possible implant, need for reoperation, postoperative pain and swelling, intra-or postoperative fracture, postoperative dislocation, leg length inequality, need for reoperation, implant failure, death, disability, organ dysfunction, wound healing issues, DVT, PE, and the need for further procedures.  The patient did freely state their understanding and satisfaction with our discussion.  We will proceed after medical clearances.      Total Time: minutes: 5-10 minutes    Sathish Lr was evaluated through a synchronous (real-time) audio encounter. Patient identification was verified at the start of the visit. He (or guardian if applicable) is aware that this is a billable service, which includes applicable co-pays. This visit was conducted with the patient's (and/or legal guardian's) verbal consent. He has not had a related appointment within my department in the past 7 days or scheduled within the next 24 hours.   The patient was located at Home: [address] [place] [id number].  The provider was located at [facility]App[facility] [facility] [address] [place] [id number].    Note: not billable if this call serves to triage the patient into an appointment for the relevant concern  Yes, I confirm.   Sathish Lr is a 53 y.o. male evaluated via telephone on 5/13/2024 for Follow-up (Follow up MRI results of left elbow)  .        Rodger Taylor DO

## 2024-05-13 NOTE — TELEPHONE ENCOUNTER
Patient has Been Scheduled for Sx on 5/16/24. Post opt Appointment was made as well.          ----- Message from Rodger Taylor DO sent at 5/13/2024  2:50 PM EDT -----  Diagnosis: left biceps tendon rupture s46.292A  Procedure: left biceps tendon repair  CPT: 40704  Operative minutes: 60  Disposition postop: home  Location: [facility] Main OR  PAT: no  Class: no  Special Equipment: arthrex distal biceps tenodesis kit, big c arm, hand table  Staffing: Assistant/retractor diez no  Anesthesia: axillary with mac  Surgical Index 1    This week please

## 2024-05-15 NOTE — H&P
CC: Left arm pain     HPI:      This is a 53 y.o. year old male who is left-hand dominant, he had a moment where he hyperextended his left arm, he felt a pop in his left elbow, he has had progressive pain and swelling in his elbow area with a deformity with his biceps.  No management so far.        PMH:  Past Medical History        Past Medical History:   Diagnosis Date    Anxiety disorder      Cataracts, both eyes      Diabetes (HCC)      Hypercholesterolemia      Hypertension      Osteoarthritis 2023     [facility]    Sleep apnea 11/2015    Type 2 diabetes mellitus without complication (HCC)              PSxHx:  Past Surgical History         Past Surgical History:   Procedure Laterality Date    EYE SURGERY         intraocular lens replacement    FRACTURE SURGERY         Multiple surgeries    HEENT Bilateral 11/2017     cataracts in both eyes.            Meds:    Current Medication      Current Outpatient Medications:     losartan (COZAAR) 50 MG tablet, Take 1 tablet by mouth daily, Disp: 90 tablet, Rfl: 1    omeprazole (PRILOSEC OTC) 20 MG tablet, , Disp: , Rfl:     MAGNESIUM PO, , Disp: , Rfl:     vitamin B-1 (THIAMINE) 100 MG TABS tablet, , Disp: , Rfl:     atorvastatin (LIPITOR) 40 MG tablet, Take 1 tablet by mouth daily, Disp: 90 tablet, Rfl: 1    DULoxetine (CYMBALTA) 60 MG extended release capsule, Take 1 capsule by mouth daily, Disp: 90 capsule, Rfl: 1    metFORMIN (GLUCOPHAGE-XR) 500 MG extended release tablet, Take 1 tablet by mouth daily (with breakfast), Disp: 90 tablet, Rfl: 1    metoprolol succinate (TOPROL XL) 100 MG extended release tablet, Take 1 tablet by mouth daily, Disp: 90 tablet, Rfl: 1    aspirin 81 MG EC tablet, Take 1 tablet by mouth daily, Disp: , Rfl:     vitamin D (CHOLECALCIFEROL) 25 MCG (1000 UT) TABS tablet, Take 1 tablet by mouth daily, Disp: , Rfl:         All:       Allergies   Allergen Reactions    Lisinopril Angioedema         Social Hx:  Social History   Social

## 2024-05-16 ENCOUNTER — ANESTHESIA EVENT (OUTPATIENT)
Facility: HOSPITAL | Age: 54
End: 2024-05-16
Payer: COMMERCIAL

## 2024-05-16 ENCOUNTER — ANESTHESIA (OUTPATIENT)
Facility: HOSPITAL | Age: 54
End: 2024-05-16
Payer: COMMERCIAL

## 2024-05-16 ENCOUNTER — HOSPITAL ENCOUNTER (OUTPATIENT)
Facility: HOSPITAL | Age: 54
Setting detail: OUTPATIENT SURGERY
Discharge: HOME | End: 2024-05-16
Attending: ORTHOPAEDIC SURGERY | Admitting: ORTHOPAEDIC SURGERY
Payer: COMMERCIAL

## 2024-05-16 ENCOUNTER — APPOINTMENT (OUTPATIENT)
Facility: HOSPITAL | Age: 54
End: 2024-05-16
Attending: ORTHOPAEDIC SURGERY
Payer: COMMERCIAL

## 2024-05-16 VITALS
HEIGHT: 66 IN | WEIGHT: 188.27 LBS | BODY MASS INDEX: 30.26 KG/M2 | RESPIRATION RATE: 16 BRPM | SYSTOLIC BLOOD PRESSURE: 152 MMHG | DIASTOLIC BLOOD PRESSURE: 79 MMHG | OXYGEN SATURATION: 96 % | HEART RATE: 88 BPM | TEMPERATURE: 97.8 F

## 2024-05-16 DIAGNOSIS — Z47.89 ORTHOPEDIC AFTERCARE: Primary | ICD-10-CM

## 2024-05-16 LAB
GLUCOSE BLD STRIP.AUTO-MCNC: 110 MG/DL (ref 65–117)
GLUCOSE BLD STRIP.AUTO-MCNC: 115 MG/DL (ref 65–117)
SERVICE CMNT-IMP: NORMAL
SERVICE CMNT-IMP: NORMAL

## 2024-05-16 PROCEDURE — 7100000001 HC PACU RECOVERY - ADDTL 15 MIN: Performed by: ORTHOPAEDIC SURGERY

## 2024-05-16 PROCEDURE — 3600000004 HC SURGERY LEVEL 4 BASE: Performed by: ORTHOPAEDIC SURGERY

## 2024-05-16 PROCEDURE — 3700000000 HC ANESTHESIA ATTENDED CARE: Performed by: ORTHOPAEDIC SURGERY

## 2024-05-16 PROCEDURE — 6360000002 HC RX W HCPCS: Performed by: ANESTHESIOLOGY

## 2024-05-16 PROCEDURE — 2709999900 HC NON-CHARGEABLE SUPPLY: Performed by: ORTHOPAEDIC SURGERY

## 2024-05-16 PROCEDURE — 7100000000 HC PACU RECOVERY - FIRST 15 MIN: Performed by: ORTHOPAEDIC SURGERY

## 2024-05-16 PROCEDURE — 2580000003 HC RX 258: Performed by: ORTHOPAEDIC SURGERY

## 2024-05-16 PROCEDURE — 3700000001 HC ADD 15 MINUTES (ANESTHESIA): Performed by: ORTHOPAEDIC SURGERY

## 2024-05-16 PROCEDURE — 6360000002 HC RX W HCPCS: Performed by: ORTHOPAEDIC SURGERY

## 2024-05-16 PROCEDURE — 82962 GLUCOSE BLOOD TEST: CPT

## 2024-05-16 PROCEDURE — 7100000011 HC PHASE II RECOVERY - ADDTL 15 MIN: Performed by: ORTHOPAEDIC SURGERY

## 2024-05-16 PROCEDURE — 6370000000 HC RX 637 (ALT 250 FOR IP): Performed by: ORTHOPAEDIC SURGERY

## 2024-05-16 PROCEDURE — 7100000010 HC PHASE II RECOVERY - FIRST 15 MIN: Performed by: ORTHOPAEDIC SURGERY

## 2024-05-16 PROCEDURE — 64415 NJX AA&/STRD BRCH PLXS IMG: CPT | Performed by: ANESTHESIOLOGY

## 2024-05-16 PROCEDURE — C1713 ANCHOR/SCREW BN/BN,TIS/BN: HCPCS | Performed by: ORTHOPAEDIC SURGERY

## 2024-05-16 PROCEDURE — 2580000003 HC RX 258: Performed by: STUDENT IN AN ORGANIZED HEALTH CARE EDUCATION/TRAINING PROGRAM

## 2024-05-16 PROCEDURE — 3600000014 HC SURGERY LEVEL 4 ADDTL 15MIN: Performed by: ORTHOPAEDIC SURGERY

## 2024-05-16 PROCEDURE — 6360000002 HC RX W HCPCS: Performed by: NURSE ANESTHETIST, CERTIFIED REGISTERED

## 2024-05-16 DEVICE — IMPL DELIVERY SYS,DISTAL BICEPS, BC
Type: IMPLANTABLE DEVICE | Site: ARM | Status: FUNCTIONAL
Brand: ARTHREX®

## 2024-05-16 RX ORDER — PROPOFOL 10 MG/ML
INJECTION, EMULSION INTRAVENOUS PRN
Status: DISCONTINUED | OUTPATIENT
Start: 2024-05-16 | End: 2024-05-16 | Stop reason: SDUPTHER

## 2024-05-16 RX ORDER — FENTANYL CITRATE 50 UG/ML
50 INJECTION, SOLUTION INTRAMUSCULAR; INTRAVENOUS EVERY 5 MIN PRN
Status: DISCONTINUED | OUTPATIENT
Start: 2024-05-16 | End: 2024-05-16 | Stop reason: HOSPADM

## 2024-05-16 RX ORDER — SODIUM CHLORIDE 0.9 % (FLUSH) 0.9 %
5-40 SYRINGE (ML) INJECTION PRN
Status: DISCONTINUED | OUTPATIENT
Start: 2024-05-16 | End: 2024-05-16 | Stop reason: HOSPADM

## 2024-05-16 RX ORDER — SODIUM CHLORIDE 0.9 % (FLUSH) 0.9 %
5-40 SYRINGE (ML) INJECTION EVERY 12 HOURS SCHEDULED
Status: DISCONTINUED | OUTPATIENT
Start: 2024-05-16 | End: 2024-05-16 | Stop reason: HOSPADM

## 2024-05-16 RX ORDER — HYDROMORPHONE HYDROCHLORIDE 1 MG/ML
0.5 INJECTION, SOLUTION INTRAMUSCULAR; INTRAVENOUS; SUBCUTANEOUS EVERY 5 MIN PRN
Status: DISCONTINUED | OUTPATIENT
Start: 2024-05-16 | End: 2024-05-16 | Stop reason: HOSPADM

## 2024-05-16 RX ORDER — ONDANSETRON 2 MG/ML
INJECTION INTRAMUSCULAR; INTRAVENOUS PRN
Status: DISCONTINUED | OUTPATIENT
Start: 2024-05-16 | End: 2024-05-16 | Stop reason: SDUPTHER

## 2024-05-16 RX ORDER — MEPERIDINE HYDROCHLORIDE 25 MG/ML
12.5 INJECTION INTRAMUSCULAR; INTRAVENOUS; SUBCUTANEOUS EVERY 5 MIN PRN
Status: DISCONTINUED | OUTPATIENT
Start: 2024-05-16 | End: 2024-05-16 | Stop reason: HOSPADM

## 2024-05-16 RX ORDER — SODIUM CHLORIDE 9 MG/ML
INJECTION, SOLUTION INTRAVENOUS PRN
Status: DISCONTINUED | OUTPATIENT
Start: 2024-05-16 | End: 2024-05-16 | Stop reason: HOSPADM

## 2024-05-16 RX ORDER — LIDOCAINE HYDROCHLORIDE 10 MG/ML
1 INJECTION, SOLUTION EPIDURAL; INFILTRATION; INTRACAUDAL; PERINEURAL
Status: DISCONTINUED | OUTPATIENT
Start: 2024-05-16 | End: 2024-05-16 | Stop reason: HOSPADM

## 2024-05-16 RX ORDER — PROCHLORPERAZINE EDISYLATE 5 MG/ML
5 INJECTION INTRAMUSCULAR; INTRAVENOUS
Status: DISCONTINUED | OUTPATIENT
Start: 2024-05-16 | End: 2024-05-16 | Stop reason: HOSPADM

## 2024-05-16 RX ORDER — PHENYLEPHRINE HCL IN 0.9% NACL 0.4MG/10ML
SYRINGE (ML) INTRAVENOUS PRN
Status: DISCONTINUED | OUTPATIENT
Start: 2024-05-16 | End: 2024-05-16 | Stop reason: SDUPTHER

## 2024-05-16 RX ORDER — KETOROLAC TROMETHAMINE 30 MG/ML
30 INJECTION, SOLUTION INTRAMUSCULAR; INTRAVENOUS ONCE
Status: COMPLETED | OUTPATIENT
Start: 2024-05-16 | End: 2024-05-16

## 2024-05-16 RX ORDER — ACETAMINOPHEN 500 MG
1000 TABLET ORAL ONCE
Status: COMPLETED | OUTPATIENT
Start: 2024-05-16 | End: 2024-05-16

## 2024-05-16 RX ORDER — NALOXONE HYDROCHLORIDE 0.4 MG/ML
INJECTION, SOLUTION INTRAMUSCULAR; INTRAVENOUS; SUBCUTANEOUS PRN
Status: DISCONTINUED | OUTPATIENT
Start: 2024-05-16 | End: 2024-05-16 | Stop reason: HOSPADM

## 2024-05-16 RX ORDER — DEXAMETHASONE SODIUM PHOSPHATE 4 MG/ML
INJECTION, SOLUTION INTRA-ARTICULAR; INTRALESIONAL; INTRAMUSCULAR; INTRAVENOUS; SOFT TISSUE PRN
Status: DISCONTINUED | OUTPATIENT
Start: 2024-05-16 | End: 2024-05-16 | Stop reason: SDUPTHER

## 2024-05-16 RX ORDER — SODIUM CHLORIDE, SODIUM LACTATE, POTASSIUM CHLORIDE, CALCIUM CHLORIDE 600; 310; 30; 20 MG/100ML; MG/100ML; MG/100ML; MG/100ML
INJECTION, SOLUTION INTRAVENOUS CONTINUOUS
Status: DISCONTINUED | OUTPATIENT
Start: 2024-05-16 | End: 2024-05-16 | Stop reason: HOSPADM

## 2024-05-16 RX ORDER — SODIUM CHLORIDE, SODIUM LACTATE, POTASSIUM CHLORIDE, CALCIUM CHLORIDE 600; 310; 30; 20 MG/100ML; MG/100ML; MG/100ML; MG/100ML
INJECTION, SOLUTION INTRAVENOUS ONCE
Status: COMPLETED | OUTPATIENT
Start: 2024-05-16 | End: 2024-05-16

## 2024-05-16 RX ORDER — ROPIVACAINE HYDROCHLORIDE 5 MG/ML
INJECTION, SOLUTION EPIDURAL; INFILTRATION; PERINEURAL
Status: COMPLETED | OUTPATIENT
Start: 2024-05-16 | End: 2024-05-16

## 2024-05-16 RX ORDER — ONDANSETRON 2 MG/ML
4 INJECTION INTRAMUSCULAR; INTRAVENOUS
Status: DISCONTINUED | OUTPATIENT
Start: 2024-05-16 | End: 2024-05-16 | Stop reason: HOSPADM

## 2024-05-16 RX ORDER — FENTANYL CITRATE 50 UG/ML
INJECTION, SOLUTION INTRAMUSCULAR; INTRAVENOUS PRN
Status: DISCONTINUED | OUTPATIENT
Start: 2024-05-16 | End: 2024-05-16 | Stop reason: SDUPTHER

## 2024-05-16 RX ORDER — HYDROCODONE BITARTRATE AND ACETAMINOPHEN 5; 325 MG/1; MG/1
1 TABLET ORAL EVERY 4 HOURS PRN
Qty: 18 TABLET | Refills: 0 | Status: SHIPPED | OUTPATIENT
Start: 2024-05-16 | End: 2024-05-19

## 2024-05-16 RX ORDER — MIDAZOLAM HYDROCHLORIDE 1 MG/ML
INJECTION INTRAMUSCULAR; INTRAVENOUS
Status: COMPLETED | OUTPATIENT
Start: 2024-05-16 | End: 2024-05-16

## 2024-05-16 RX ADMIN — WATER 2000 MG: 1 INJECTION INTRAMUSCULAR; INTRAVENOUS; SUBCUTANEOUS at 10:49

## 2024-05-16 RX ADMIN — KETOROLAC TROMETHAMINE 30 MG: 30 INJECTION, SOLUTION INTRAMUSCULAR at 10:32

## 2024-05-16 RX ADMIN — ONDANSETRON HYDROCHLORIDE 4 MG: 2 INJECTION, SOLUTION INTRAMUSCULAR; INTRAVENOUS at 10:51

## 2024-05-16 RX ADMIN — Medication 120 MCG: at 11:44

## 2024-05-16 RX ADMIN — DEXAMETHASONE SODIUM PHOSPHATE 4 MG: 4 INJECTION, SOLUTION INTRAMUSCULAR; INTRAVENOUS at 10:51

## 2024-05-16 RX ADMIN — FENTANYL CITRATE 50 MCG: 50 INJECTION, SOLUTION INTRAMUSCULAR; INTRAVENOUS at 10:56

## 2024-05-16 RX ADMIN — FENTANYL CITRATE 50 MCG: 50 INJECTION, SOLUTION INTRAMUSCULAR; INTRAVENOUS at 10:49

## 2024-05-16 RX ADMIN — PROPOFOL 10 MG: 10 INJECTION, EMULSION INTRAVENOUS at 11:55

## 2024-05-16 RX ADMIN — Medication 200 MCG: at 11:46

## 2024-05-16 RX ADMIN — SODIUM CHLORIDE, POTASSIUM CHLORIDE, SODIUM LACTATE AND CALCIUM CHLORIDE: 600; 310; 30; 20 INJECTION, SOLUTION INTRAVENOUS at 10:33

## 2024-05-16 RX ADMIN — ROPIVACAINE HYDROCHLORIDE 30 ML: 5 INJECTION, SOLUTION EPIDURAL; INFILTRATION; PERINEURAL at 10:24

## 2024-05-16 RX ADMIN — Medication 200 MCG: at 11:58

## 2024-05-16 RX ADMIN — ACETAMINOPHEN 1000 MG: 500 TABLET ORAL at 10:01

## 2024-05-16 RX ADMIN — PROPOFOL 50 MG: 10 INJECTION, EMULSION INTRAVENOUS at 10:41

## 2024-05-16 RX ADMIN — MIDAZOLAM HYDROCHLORIDE 2 MG: 1 INJECTION, SOLUTION INTRAMUSCULAR; INTRAVENOUS at 10:24

## 2024-05-16 RX ADMIN — PROPOFOL 125 MCG/KG/MIN: 10 INJECTION, EMULSION INTRAVENOUS at 10:42

## 2024-05-16 RX ADMIN — Medication 200 MCG: at 11:51

## 2024-05-16 NOTE — ANESTHESIA PRE PROCEDURE
Department of Anesthesiology  Preprocedure Note       Name:  Sathish Lr   Age:  53 y.o.  :  1970                                          MRN:  [id number]         Date:  2024      Surgeon: Surgeon(s):  Rodger Taylor DO    Procedure: Procedure(s):  LEFT BICEPS TENDON REPAIR (MAC W/BLOCK)    Medications prior to admission:   Prior to Admission medications    Medication Sig Start Date End Date Taking? Authorizing Provider   losartan (COZAAR) 50 MG tablet Take 1 tablet by mouth daily 24   Shi Duffy APRN - NP   omeprazole (PRILOSEC OTC) 20 MG tablet     Provider, MD Marva   MAGNESIUM PO     Provider, MD Marva   vitamin B-1 (THIAMINE) 100 MG TABS tablet     ProviderMarva MD   atorvastatin (LIPITOR) 40 MG tablet Take 1 tablet by mouth daily 24   Shi Duffy APRN - NP   DULoxetine (CYMBALTA) 60 MG extended release capsule Take 1 capsule by mouth daily 24   Shi Duffy APRN - NP   metFORMIN (GLUCOPHAGE-XR) 500 MG extended release tablet Take 1 tablet by mouth daily (with breakfast) 24   Shi Duffy APRN - NP   metoprolol succinate (TOPROL XL) 100 MG extended release tablet Take 1 tablet by mouth daily 24   Shi Duffy APRN - NP   aspirin 81 MG EC tablet Take 1 tablet by mouth daily 17   Automatic Reconciliation, Ar   vitamin D (CHOLECALCIFEROL) 25 MCG (1000 UT) TABS tablet Take 1 tablet by mouth daily    Automatic Reconciliation, Ar       Current medications:    Current Facility-Administered Medications   Medication Dose Route Frequency Provider Last Rate Last Admin   • lidocaine PF 1 % injection 1 mL  1 mL IntraDERmal Once PRN Marshal Cope MD       • lactated ringers IV soln infusion   IntraVENous Continuous Marshal Cope MD       • sodium chloride flush 0.9 % injection 5-40 mL  5-40 mL IntraVENous 2 times per day Marshal Cope MD       • sodium chloride flush 0.9 % injection 5-40 mL  5-40 mL IntraVENous PRN Marshal Cope MD

## 2024-05-16 NOTE — OP NOTE
confirmed, left arm was prepped and draped in a sterile orthopedic fashion.  I then took final timeout, incision was made approximately 4 cm distal to the elbow crease over the radial tuberosity, sharp dissection was carried just through dermis, I then utilized tenotomy scissors to protect the lateral antebrachial cutaneous nerve, fascia was then incised, I then utilized blunt dissection down to the radial tuberosity and muscular retraction was performed with use of [employer] retractors, I identified the distal stump of the torn biceps tendon, I then tunneled proximally to gain access to the tendon, I confirmed this with tendon by palpating the biceps itself and then isolating distally, I held this with an Allis clamp, I then utilized the Arthrex fiber loop to control the tendon, once I had control of the tendon, I identified the radial tuberosity with fluoroscopy, I then drilled bicortically, I then utilized the [employer] corinne to drill unit cortically, I then deployed the button onto the backside of the radius, this was confirmed on x-ray and deployed well, this had good tension, I then flexed the elbow and pulled the tendon into the bony recess, I then utilized a free needle to suture the tendon and then I placed the interference screw in accordance with standard technique.  I then placed the final stitch, there was a noted reconstitution of biceps tension.  I then irrigated thoroughly.  Care was taken to avoid any excessive retraction throughout the case.  Wound was irrigated, I then closed with 2-0 Vicryl, 3-0 Monocryl, Dermabond and Steri-Strips, sterile dressing was applied.  He was placed in a sling and awoken without complication.    Postoperative plan: Patient will be nonweightbearing left upper extremity, he will remain in his sling except for hygiene until I see him back in 2 weeks. No xrays on follow up.     Electronically signed by Rodger Taylor DO on 5/16/2024 at 2:39 PM

## 2024-05-16 NOTE — ANESTHESIA POSTPROCEDURE EVALUATION
Department of Anesthesiology  Postprocedure Note    Patient: Sathish Lr  MRN: [id number]  YOB: 1970  Date of evaluation: 5/16/2024    Procedure Summary       Date: 05/16/24 Room / Location: [facility] OR M[facility] / [facility] OR    Anesthesia Start: 1037 Anesthesia Stop: 1225    Procedure: LEFT BICEPS TENDON REPAIR (MAC W/BLOCK) (Left: Arm Upper) Diagnosis:       Other injury of muscle, fascia and tendon of other parts of biceps, left arm, initial encounter      (Other injury of muscle, fascia and tendon of other parts of biceps, left arm, initial encounter [S46.292A])    Providers: Rodger Taylor DO Responsible Provider: Marshal Cope MD    Anesthesia Type: Regional, MAC ASA Status: 2            Anesthesia Type: Regional, MAC    Khadijah Phase I: Khadijah Score: 9    Khadijah Phase II:      Anesthesia Post Evaluation    Patient location during evaluation: PACU  Patient participation: complete - patient participated  Level of consciousness: sleepy but conscious and responsive to verbal stimuli  Airway patency: patent  Nausea & Vomiting: no vomiting and no nausea  Cardiovascular status: blood pressure returned to baseline and hemodynamically stable  Respiratory status: acceptable  Hydration status: stable    No notable events documented.

## 2024-05-16 NOTE — DISCHARGE INSTRUCTIONS
If you have specific questions: CALL OUR OFFICE: [phone]    DRESSING:  Keep dressing on, clean, and dry for three days.    Three days after your surgery, you may remove the dressing and wash hand with soap and water.  You are not allowed to place this underwater, and once it is cleaned, you should put an occlusive dressing over the wound.    ACTIVITY:  Keep the operative hand elevated as much as possible for the next two weeks    It is ok to begin to wiggle the fingers, move the hand and wrist to your tolerance, but do not do any lifting more than food or basic hygiene.      GENERAL PAIN CONTROL:    IF YOU HAVE HAD A NERVE BLOCK: remember, you will have an increase in pain sometime in the first 24 hours after your surgery.  This can be significant, make sure you are consistently medicating and expect that increase in pain.  This is normal, but will require you to be proactive in your pain control.   If this increase in pain persists - call Dr. Taylor's office.    Take pain medications as needed and prescribed.  Never exceed the maximum dosage.  It is reasonable to take something for pain prior to night time on the first night to avoid severe pain in the night.    Ice is an important part of your recovery, and best if you do not apply ice or ice pack directly to skin, but use a towel or cloth on your skin.  Do this for twenty minutes on the skin, then at least twenty minutes off of your skin.            General Postoperative Instructions:    If you have specific questions: CALL OUR OFFICE: [phone]    Diet: It is OK to resume your regular diet postoperatively.   Start with light liquids and then slowly increase what you eat to avoid postoperative nausea and vomiting.  If you do experience nausea, restrict fatty or greasy foods until this passes.  If you have continued issues, please call our office number for help: [phone].    Dressings: In general, dressings should remain dry and clean.  In many

## 2024-05-16 NOTE — ANESTHESIA PROCEDURE NOTES
Peripheral Block    Patient location during procedure: procedure area  Reason for block: post-op pain management and at surgeon's request  Start time: 5/16/2024 10:24 AM  End time: 5/16/2024 10:29 AM  Staffing  Performed: anesthesiologist   Performed by: Marshal Cope MD  Authorized by: Marshal Cope MD    Preanesthetic Checklist  Completed: patient identified, IV checked, site marked, risks and benefits discussed, surgical/procedural consents, equipment checked, pre-op evaluation, timeout performed, anesthesia consent given, oxygen available, monitors applied/VS acknowledged, fire risk safety assessment completed and verbalized and blood product R/B/A discussed and consented  Peripheral Block   Patient position: supine  Prep: ChloraPrep  Provider prep: mask, sterile gloves and sterile gown  Patient monitoring: cardiac monitor, continuous pulse ox, frequent blood pressure checks, IV access, oxygen and responsive to questions  Block type: Brachial plexus  Supraclavicular  Laterality: left  Injection technique: single-shot  Guidance: ultrasound guided    Needle   Needle type: insulated echogenic nerve stimulator needle   Needle gauge: 22 G  Needle localization: anatomical landmarks and ultrasound guidance  Assessment   Injection assessment: negative aspiration for heme, no paresthesia on injection, local visualized surrounding nerve on ultrasound and no intravascular symptoms  Paresthesia pain: none  Slow fractionated injection: yes  Hemodynamics: stable  Outcomes: uncomplicated and patient tolerated procedure well    Medications Administered  midazolam (VERSED) injection 2 mg/2mL - IntraVENous   2 mg - 5/16/2024 10:24:00 AM  ropivacaine (NAROPIN) injection 0.5% - Perineural   30 mL - 5/16/2024 10:24:00 AM

## 2024-05-16 NOTE — DISCHARGE SUMMARY
Date of Admission: 5/16/2024  Date of Discharge: 5/16/2024    Attending on admission and discharge: Dr. Rodger Taylor    Admitting Diagnosis: left distal biceps tendon rupture  Discharge Diagnosis: same  Procedure Performed: left distal biceps tendon repair    Hospital Course and Treatment:     The patient was admitted through the preop holding area.  The patient tolerated the procedure well and was taken to the pacu for further observation and treatment.  The patient was maintained on IV fluids until tolerating a PO diet. They were also maintained on sequential compression devices and DVT prophylaxis.  The diet was advanced as tolerated.  The patient was mobilized. There were no complications during the course of the hospital stay, and the patient was deemed medically stable for discharge to home.  After consultation with physical therapy, the patient was cleared for discharge.    Discharge instructions:  The patient should not be in a pool or tub, or otherwise immerse the wound in water.  The patient has been counseled on the importance of mobilizing and moving at least every two hours to help prevent blood clots.  The patient should call Dr. Taylor's office or go to an emergency department if they note a fever over 101.1 degrees fahrenheit, more or unrelieved pain, more or new swelling at the operative site, or any drainage from the wound.    Condition at Discharge: Stable    Discharge medications:  Resume regular home medications  Additional medications to be prescribed on discharge    norco      Follow up instructions: The patient should follow up in 2  weeks for a wound check and xrays with Dr. Taylor.

## 2024-05-17 ENCOUNTER — TELEPHONE (OUTPATIENT)
Age: 54
End: 2024-05-17

## 2024-05-17 NOTE — TELEPHONE ENCOUNTER
Patient called in stating that he had left biceps tendon repair with Dr. Taylor yesterday and due to his discharge paperwork advising him to not participate in overly strenuous work is wanting a letter excusing him from work until his 2 wk post op appt on 5/30/24. 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     Patient has been advised that he should check with his HR dept if they need any paperwork filled out for him to be out of work for the two weeks or however long Dr. Taylor advises.     Patient would like a call back at [phone] when the letter is available in [employer]

## 2024-05-30 ENCOUNTER — OFFICE VISIT (OUTPATIENT)
Age: 54
End: 2024-05-30

## 2024-05-30 VITALS — HEIGHT: 66 IN | BODY MASS INDEX: 30.28 KG/M2 | WEIGHT: 188.4 LBS

## 2024-05-30 DIAGNOSIS — S46.212A RUPTURE OF LEFT DISTAL BICEPS TENDON, INITIAL ENCOUNTER: Primary | ICD-10-CM

## 2024-05-30 DIAGNOSIS — Z47.89 ORTHOPEDIC AFTERCARE: ICD-10-CM

## 2024-05-30 PROCEDURE — 99024 POSTOP FOLLOW-UP VISIT: CPT | Performed by: ORTHOPAEDIC SURGERY

## 2024-05-30 NOTE — PROGRESS NOTES
is here for a follow up visit from a left distal biceps repair.    Pain has been appropriate since surgery, no major medical complications since surgery.       Current Outpatient Medications on File Prior to Visit   Medication Sig Dispense Refill    losartan (COZAAR) 50 MG tablet Take 1 tablet by mouth daily 90 tablet 1    omeprazole (PRILOSEC OTC) 20 MG tablet       MAGNESIUM PO       vitamin B-1 (THIAMINE) 100 MG TABS tablet       atorvastatin (LIPITOR) 40 MG tablet Take 1 tablet by mouth daily 90 tablet 1    DULoxetine (CYMBALTA) 60 MG extended release capsule Take 1 capsule by mouth daily 90 capsule 1    metFORMIN (GLUCOPHAGE-XR) 500 MG extended release tablet Take 1 tablet by mouth daily (with breakfast) 90 tablet 1    metoprolol succinate (TOPROL XL) 100 MG extended release tablet Take 1 tablet by mouth daily 90 tablet 1    aspirin 81 MG EC tablet Take 1 tablet by mouth daily      vitamin D (CHOLECALCIFEROL) 25 MCG (1000 UT) TABS tablet Take 1 tablet by mouth daily       No current facility-administered medications on file prior to visit.       ROS:  General: denies agitation, major chest pain, unexpected weakness  Patient states appropriate pain  Skin: healing wound is without issue or drainage    Strength: appropriate weakness of involved extremity is resolving since surgery      Physical Examination:    Height 1.676 m (5' 6\"), weight 85.5 kg (188 lb 6.4 oz).    Dressing: None  Skin: Clean dry and intact  Sensation intact to light touch at level of wound and distally  Strength is not tested, however he does have palpable tendon with hook test  Range of motion is full at the elbow  Distal swelling is noted, but appropriate for postoperative course  Distal capillary refill less than 2 seconds      Imaging:    Postoperative imaging: None today      Assessment: Status post left distal biceps tendon repair    Plan:  Patient will start home physical therapy  Wound care was reviewed  Weightbearing will be

## 2024-05-30 NOTE — PROGRESS NOTES
Identified pt with two pt identifiers (name and ). Reviewed chart in preparation for visit and have obtained necessary documentation.    Sathish Lr is a 53 y.o. male Post-Op Check (2 week PO lt bicep tendon repair sx: 2024)  .    Vitals:    24 1402   Weight: 85.5 kg (188 lb 6.4 oz)   Height: 1.676 m (5' 6\")          1. Have you been to the ER, urgent care clinic since your last visit?  Hospitalized since your last visit?  no     2. Have you seen or consulted any other health care providers outside of the [facility] since your last visit?  Include any pap smears or colon screening.  no

## 2024-06-12 ENCOUNTER — OFFICE VISIT (OUTPATIENT)
Age: 54
End: 2024-06-12

## 2024-06-12 VITALS — HEIGHT: 66 IN | BODY MASS INDEX: 30.42 KG/M2

## 2024-06-12 DIAGNOSIS — S46.212A RUPTURE OF LEFT DISTAL BICEPS TENDON, INITIAL ENCOUNTER: Primary | ICD-10-CM

## 2024-06-12 PROCEDURE — 99024 POSTOP FOLLOW-UP VISIT: CPT | Performed by: ORTHOPAEDIC SURGERY

## 2024-06-12 ASSESSMENT — PATIENT HEALTH QUESTIONNAIRE - PHQ9
SUM OF ALL RESPONSES TO PHQ QUESTIONS 1-9: 0
SUM OF ALL RESPONSES TO PHQ9 QUESTIONS 1 & 2: 0
SUM OF ALL RESPONSES TO PHQ QUESTIONS 1-9: 0
SUM OF ALL RESPONSES TO PHQ QUESTIONS 1-9: 0
1. LITTLE INTEREST OR PLEASURE IN DOING THINGS: NOT AT ALL
2. FEELING DOWN, DEPRESSED OR HOPELESS: NOT AT ALL
SUM OF ALL RESPONSES TO PHQ QUESTIONS 1-9: 0

## 2024-06-12 NOTE — PROGRESS NOTES
is here for a follow up visit from a left distal biceps tendon repair.    Pain has been appropriate since surgery, no major medical complications since surgery.      Current Outpatient Medications on File Prior to Visit   Medication Sig Dispense Refill    losartan (COZAAR) 50 MG tablet Take 1 tablet by mouth daily 90 tablet 1    omeprazole (PRILOSEC OTC) 20 MG tablet       MAGNESIUM PO       vitamin B-1 (THIAMINE) 100 MG TABS tablet       atorvastatin (LIPITOR) 40 MG tablet Take 1 tablet by mouth daily 90 tablet 1    DULoxetine (CYMBALTA) 60 MG extended release capsule Take 1 capsule by mouth daily 90 capsule 1    metFORMIN (GLUCOPHAGE-XR) 500 MG extended release tablet Take 1 tablet by mouth daily (with breakfast) 90 tablet 1    metoprolol succinate (TOPROL XL) 100 MG extended release tablet Take 1 tablet by mouth daily 90 tablet 1    aspirin 81 MG EC tablet Take 1 tablet by mouth daily      vitamin D (CHOLECALCIFEROL) 25 MCG (1000 UT) TABS tablet Take 1 tablet by mouth daily       No current facility-administered medications on file prior to visit.       ROS:  General: denies agitation, major chest pain, unexpected weakness  Patient states improving pain  Skin: healing wound is without issue or drainage   Strength: appropriate weakness of involved extremity is resolving since surgery      Physical Examination:    There were no vitals taken for this visit.    Dressing: none  Skin: clean, dry, intact  Sensation intact to light touch at level of wound and distally  Strength is not tested, hook test indicates intact distal biceps tendon  Range of motion is full at the elbow  Distal swelling is noted, but appropriate for postoperative course  Distal capillary refill less than 2 seconds      Imaging:    Postoperative imaging: none today      Assessment: Status post left distal biceps tendon repair    Plan:  Patient will start physical therapy  Wound care was reviewed  Weightbearing will be 5 lbs through the

## 2024-06-12 NOTE — PROGRESS NOTES
Identified pt with two pt identifiers (name and ). Reviewed chart in preparation for visit and have obtained necessary documentation.    Sathish Lr is a 53 y.o. male Post-Op Check (Left Biceps Tendon Repair )  .    Vitals:    24 0919   Height: 1.676 m (5' 5.98\")          1. Have you been to the ER, urgent care clinic since your last visit?  Hospitalized since your last visit?  no     2. Have you seen or consulted any other health care providers outside of the [facility] since your last visit?  Include any pap smears or colon screening.  no

## 2024-07-09 DIAGNOSIS — Z47.89 ORTHOPEDIC AFTERCARE: Primary | ICD-10-CM

## 2024-07-18 ENCOUNTER — OFFICE VISIT (OUTPATIENT)
Age: 54
End: 2024-07-18

## 2024-07-18 VITALS — WEIGHT: 188.4 LBS | HEIGHT: 65 IN | BODY MASS INDEX: 31.39 KG/M2

## 2024-07-18 DIAGNOSIS — S46.212A RUPTURE OF LEFT DISTAL BICEPS TENDON, INITIAL ENCOUNTER: Primary | ICD-10-CM

## 2024-07-18 DIAGNOSIS — Z47.89 ORTHOPEDIC AFTERCARE: ICD-10-CM

## 2024-07-18 ASSESSMENT — PATIENT HEALTH QUESTIONNAIRE - PHQ9
SUM OF ALL RESPONSES TO PHQ QUESTIONS 1-9: 0
SUM OF ALL RESPONSES TO PHQ QUESTIONS 1-9: 0
1. LITTLE INTEREST OR PLEASURE IN DOING THINGS: NOT AT ALL
SUM OF ALL RESPONSES TO PHQ QUESTIONS 1-9: 0
SUM OF ALL RESPONSES TO PHQ9 QUESTIONS 1 & 2: 0
2. FEELING DOWN, DEPRESSED OR HOPELESS: NOT AT ALL
SUM OF ALL RESPONSES TO PHQ QUESTIONS 1-9: 0

## 2024-07-18 NOTE — PROGRESS NOTES
Identified pt with two pt identifiers (name and ). Reviewed chart in preparation for visit and have obtained necessary documentation.    Sathish Lr is a 53 y.o. male Post-Op Check (1 month PO LT distal bicep tendon repair )  .    Vitals:    24 1409   Weight: 85.5 kg (188 lb 6.4 oz)   Height: 1.651 m (5' 5\")          1. Have you been to the ER, urgent care clinic since your last visit?  Hospitalized since your last visit?  no     2. Have you seen or consulted any other health care providers outside of the [facility] since your last visit?  Include any pap smears or colon screening.  no  
will be at 4 weeks from now,  no xrays on follow up

## 2024-08-02 ENCOUNTER — NURSE ONLY (OUTPATIENT)
Age: 54
End: 2024-08-02
Payer: COMMERCIAL

## 2024-08-02 VITALS
SYSTOLIC BLOOD PRESSURE: 130 MMHG | OXYGEN SATURATION: 99 % | TEMPERATURE: 99.1 F | RESPIRATION RATE: 18 BRPM | HEART RATE: 108 BPM | DIASTOLIC BLOOD PRESSURE: 88 MMHG

## 2024-08-02 DIAGNOSIS — Z23 ENCOUNTER FOR IMMUNIZATION: Primary | ICD-10-CM

## 2024-08-02 PROCEDURE — 90471 IMMUNIZATION ADMIN: CPT | Performed by: NURSE PRACTITIONER

## 2024-08-02 PROCEDURE — 90739 HEPB VACC 2/4 DOSE ADULT IM: CPT | Performed by: NURSE PRACTITIONER

## 2024-08-02 NOTE — PROGRESS NOTES
Chief Complaint   Patient presents with    Immunizations     For hepatitis B ( second dose )       Allergies   Allergen Reactions    Lisinopril Angioedema        Vitals:    08/02/24 1100   BP: 130/88   Pulse: (!) 108   Resp: 18   Temp: 99.1 °F (37.3 °C)   SpO2: 99%

## 2024-08-18 NOTE — PROGRESS NOTES
[facility]  Preoperative Instructions        Surgery Date 5/16/2024   Time of Arrival TBD  Contact# [phone]    On the day of your surgery, please report to Surgical Services Registration Desk and sign in at your designated time.  The Surgery Center is located to the right of the Emergency Room.     2. You must have someone with you to drive you home. You should not drive a car for 24 hours following surgery. Please make arrangements for a friend or family member to stay with you for the first 24 hours after your surgery.    3. Do not have anything to eat or drink (including water, gum, mints, coffee, juice) after midnight ?5/15/2024 .?This may not apply to medications prescribed by your physician. ?(Please note below the special instructions with medications to take the morning of your procedure.)    4. We recommend you do not drink any alcoholic beverages for 24 hours before and after your surgery.    5. Contact your surgeon’s office for instructions on the following medications: non-steroidal anti-inflammatory drugs (i.e. Advil, Aleve), vitamins, and supplements. (Some surgeon’s will want you to stop these medications prior to surgery and others may allow you to take them)  **If you are currently taking Plavix, Coumadin, Aspirin and/or other blood-thinning agents, contact your surgeon for instructions.** Your surgeon will partner with the physician prescribing these medications to determine if it is safe to stop or if you need to continue taking.  Please do not stop taking these medications without instructions from your surgeon    6. Wear comfortable clothes.  Wear glasses instead of contacts.  Do not bring any money or jewelry. Please bring picture ID, insurance card, and any prearranged co-payment or hospital payment.  Do not wear make-up, particularly mascara the morning of your surgery.  Do not wear nail polish, particularly if you are having foot /hand surgery.  Wear your  Patient seen and examined at bedside. Patient reported feeling better and eating snacks without any difficulty. No new overnights events or neurologic symptoms reported.     I agree with the findings and plan as documented in the Resident's note except below.    Impression and Plan:  Ms. Blackburn is 32 year old right handed woman with PMHx Myasthenia Gravis ( MG) on Mestinon, asthma sickle cell trait who is admitted due to the MG exacerbation. On 08/12/2024 she required mechanical ventilation and she completed first session for plasma pheresis and goal is 5 sessions over 10 days. On August 16th, 2024 she competed 3rd session of plasmapheresis. Clinically she is making recovery on daily basis.      Continue Plasmapheresis   Continue steroids  Continue to hold the Mestinon  NIF and VC Q12   Continue medical management, neuro- check and fall precaution.  GI and DVT prophylaxis.    I discussed the diagnosis and treatment plan with the patient.  All questions and concerns were addressed. The patient demonstrated good understanding of the treatment plan.  My cumulative time taking care of this patient is 30 minutes.  If you have any further questions, please do not hesitate to contact our consult service.  Thank you for allowing us to participate in this patient care. Patient seen and examined at bedside. Patient reported feeling better and eating snacks without any difficulty. No new overnights events or neurologic symptoms reported.     I agree with the findings and plan as documented in the Resident's note except below.    Impression and Plan:  Ms. Blackburn is 32 year old right handed woman with PMHx Myasthenia Gravis ( MG) on Mestinon, asthma sickle cell trait who is admitted due to the MG exacerbation. On 08/12/2024 she required mechanical ventilation and she completed first session for plasma pheresis and goal is 5 sessions over 10 days. On August 16th, 2024 she completed 3rd session of plasmapheresis. Clinically she is making recovery on daily basis.      Continue Plasmapheresis   Continue steroids  Continue to hold the Mestinon  NIF and VC Q12   Continue medical management, neuro- check and fall precaution.  GI and DVT prophylaxis.    I discussed the diagnosis and treatment plan with the patient.  All questions and concerns were addressed. The patient demonstrated good understanding of the treatment plan.  My cumulative time taking care of this patient is 30 minutes.  If you have any further questions, please do not hesitate to contact our consult service.  Thank you for allowing us to participate in this patient care.

## 2024-08-29 ENCOUNTER — OFFICE VISIT (OUTPATIENT)
Age: 54
End: 2024-08-29
Payer: COMMERCIAL

## 2024-08-29 VITALS — HEIGHT: 65 IN | BODY MASS INDEX: 31.35 KG/M2

## 2024-08-29 DIAGNOSIS — S46.212A RUPTURE OF LEFT DISTAL BICEPS TENDON, INITIAL ENCOUNTER: Primary | ICD-10-CM

## 2024-08-29 PROCEDURE — 99212 OFFICE O/P EST SF 10 MIN: CPT | Performed by: ORTHOPAEDIC SURGERY

## 2024-08-29 ASSESSMENT — PATIENT HEALTH QUESTIONNAIRE - PHQ9
2. FEELING DOWN, DEPRESSED OR HOPELESS: NOT AT ALL
1. LITTLE INTEREST OR PLEASURE IN DOING THINGS: NOT AT ALL
SUM OF ALL RESPONSES TO PHQ QUESTIONS 1-9: 0
SUM OF ALL RESPONSES TO PHQ9 QUESTIONS 1 & 2: 0

## 2024-08-29 NOTE — PROGRESS NOTES
Identified pt with two pt identifiers (name and ). Reviewed chart in preparation for visit and have obtained necessary documentation.    Sathish Lr is a 53 y.o. male Post-Op Check (LEFT BICEPS TENDON REPAIR)  .    Vitals:    24 1340   Height: 1.651 m (5' 5\")          1. Have you been to the ER, urgent care clinic since your last visit?  Hospitalized since your last visit?  no     2. Have you seen or consulted any other health care providers outside of the [facility] since your last visit?  Include any pap smears or colon screening.  no

## 2024-08-29 NOTE — PROGRESS NOTES
8/29/2024      CC: Left distal biceps tendon rupture    HPI:      This is a 53 y.o. year old male who presents for a follow up visit.  The patient was last seen and diagnosed with left distal biceps tendon rupture, status post fixation.   The patient's treatments since the most recent visit have comprised of sickle therapy.   The patient has had very good relief of the chief complaint.  Has some soreness, but no cramping and no weakness.      PMH:  Past Medical History:   Diagnosis Date    Anxiety disorder     Cataracts, both eyes     Diabetes (HCC)     Hypercholesterolemia     Hypertension     Osteoarthritis 2023    [facility] urgent care    Sleep apnea 11/2015    does not use CPAP    Type 2 diabetes mellitus without complication ([facility])        PSxHx:  Past Surgical History:   Procedure Laterality Date    BICEPS TENDON REPAIR Left 5/16/2024    LEFT BICEPS TENDON REPAIR (MAC W/BLOCK) performed by Rodger Taylor DO at [facility]    EYE SURGERY      intraocular lens replacement    FRACTURE SURGERY      Multiple surgeries    HEENT Bilateral 11/2017    cataracts in both eyes.       Meds:    Current Outpatient Medications:     losartan (COZAAR) 50 MG tablet, Take 1 tablet by mouth daily, Disp: 90 tablet, Rfl: 1    omeprazole (PRILOSEC OTC) 20 MG tablet, , Disp: , Rfl:     MAGNESIUM PO, , Disp: , Rfl:     vitamin B-1 (THIAMINE) 100 MG TABS tablet, , Disp: , Rfl:     atorvastatin (LIPITOR) 40 MG tablet, Take 1 tablet by mouth daily, Disp: 90 tablet, Rfl: 1    DULoxetine (CYMBALTA) 60 MG extended release capsule, Take 1 capsule by mouth daily, Disp: 90 capsule, Rfl: 1    metFORMIN (GLUCOPHAGE-XR) 500 MG extended release tablet, Take 1 tablet by mouth daily (with breakfast), Disp: 90 tablet, Rfl: 1    metoprolol succinate (TOPROL XL) 100 MG extended release tablet, Take 1 tablet by mouth daily, Disp: 90 tablet, Rfl: 1    aspirin 81 MG EC tablet, Take 1 tablet by mouth daily, Disp: , Rfl:     vitamin D (CHOLECALCIFEROL) 25  Denies stomach pain, heartburn, constipation, irritable bowel  Skin: Denies rash, sores, open wounds  Musculoskeletal: Mild deformity with flexion, but no pain or dysfunction  Genitourinary: Denies dysuria, hematuria, polyuria  Gastrointestinal: Denies constipation, obstipation, diarrhea  Neurological: Denies changes in sight, smell, hearing, taste, seizures. Denies loss of consciousness.  Psychiatric: Denies depression, sleep pattern changes, anxiety, change in personality  Endocrine: Denies mood swings, heat or cold intolerance  Hematologic/Lymphatic: Denies anemia, purpura, petechia  Allergic/Immunologic: Denies swelling of throat, pain or swelling at lymph nodes      Physical Examination:    There were no vitals filed for this visit.     General: AOX3, no apparent distress  Psychiatric: mood and affect appropriate  Lungs: breathing is symmetric and unlabored bilaterally  Heart: regular rate and rhythm  Abdomen: no guarding  Head: normocephalic, atraumatic  Skin: No significant abnormalities, good turgor  Sensation intact to light touch: C5-T1 dermatomes  Muscular exam: 5/5 strength in all major muscle groups unless noted in specialty exam.    Extremities        Left upper extremity: Hook test indicates that his tendon is intact, he does have a slight bulge of his biceps proximally indicating likely partial rupture, but he has full function, full strength with supination.  No cramping.  Sensation intact light touch in the C5-T1 dermatomes.  Right upper extremity: Extremity is examined, no evidence of gross deformity, no gross motor or sensory deficit.  Full active and passive range of motion is noted.  Muscle tone and bulk is within normal expected limits.  Capillary refill is less than 2 seconds distally.        Diagnostics:    Pertinent Diagnostics: **    Assessment: Status post distal biceps tendon repair  Plan:    This patient has full function, he has no deficits at this point and except for a small cosmetic

## 2024-10-05 DIAGNOSIS — I10 HTN (HYPERTENSION), BENIGN: ICD-10-CM

## 2024-10-05 DIAGNOSIS — E11.65 TYPE 2 DIABETES MELLITUS WITH HYPERGLYCEMIA, WITHOUT LONG-TERM CURRENT USE OF INSULIN (HCC): ICD-10-CM

## 2024-10-06 RX ORDER — METOPROLOL SUCCINATE 100 MG/1
100 TABLET, EXTENDED RELEASE ORAL DAILY
Qty: 90 TABLET | Refills: 0 | Status: SHIPPED | OUTPATIENT
Start: 2024-10-06

## 2024-10-06 RX ORDER — METFORMIN HCL 500 MG
500 TABLET, EXTENDED RELEASE 24 HR ORAL
Qty: 90 TABLET | Refills: 0 | Status: SHIPPED | OUTPATIENT
Start: 2024-10-06

## 2024-10-08 ENCOUNTER — OFFICE VISIT (OUTPATIENT)
Age: 54
End: 2024-10-08

## 2024-10-08 VITALS
HEART RATE: 82 BPM | TEMPERATURE: 98.7 F | SYSTOLIC BLOOD PRESSURE: 134 MMHG | OXYGEN SATURATION: 98 % | DIASTOLIC BLOOD PRESSURE: 88 MMHG | WEIGHT: 194 LBS | BODY MASS INDEX: 32.28 KG/M2

## 2024-10-08 DIAGNOSIS — H61.21 IMPACTED CERUMEN OF RIGHT EAR: ICD-10-CM

## 2024-10-08 DIAGNOSIS — H66.001 NON-RECURRENT ACUTE SUPPURATIVE OTITIS MEDIA OF RIGHT EAR WITHOUT SPONTANEOUS RUPTURE OF TYMPANIC MEMBRANE: Primary | ICD-10-CM

## 2024-10-08 RX ORDER — TIMOLOL MALEATE 5 MG/ML
1 SOLUTION/ DROPS OPHTHALMIC DAILY
COMMUNITY
Start: 2024-08-30

## 2024-10-08 RX ORDER — LOTEPREDNOL ETABONATE 5 MG/ML
1 SUSPENSION/ DROPS OPHTHALMIC 4 TIMES DAILY
COMMUNITY
Start: 2024-09-03

## 2024-10-08 NOTE — PROGRESS NOTES
Sathish Lr (:  1970) is a 54 y.o. male,Established patient, here for evaluation of the following chief complaint(s):  Otalgia (Pt present for Right ear pain for the past month )        SUBJECTIVE/OBJECTIVE:    History provided by:  Patient  Josh          54 y.o. male presents with symptoms of right ear pain for the last month. Has taken Zyrtec and Sudafed with no improvement. Put hydrogen peroxide in the ear, all with no improvement. Still feels pressure and pain. No coryzal symptoms. No sore throat, no cough, no fevers or chills. Right ear a little tender, no drainage.         Vitals:    10/08/24 0958   BP: 134/88   Site: Right Upper Arm   Position: Sitting   Cuff Size: Medium Adult   Pulse: 82   Temp: 98.7 °F (37.1 °C)   SpO2: 98%   Weight: 88 kg (194 lb)       No results found for this visit on 10/08/24.     Physical Exam  Constitutional:       General: He is not in acute distress.     Appearance: Normal appearance. He is not ill-appearing or toxic-appearing.   HENT:      Head: Normocephalic and atraumatic.      Right Ear: Ear canal and external ear normal. No drainage, swelling or tenderness. Tympanic membrane is erythematous and bulging. Tympanic membrane is not perforated.      Left Ear: Tympanic membrane, ear canal and external ear normal.      Ears:      Comments: Mild erythema of right tympanic membrane, some scarring at periphery. No tragal or mastoid tenderness.     Nose: Nose normal.      Mouth/Throat:      Mouth: Mucous membranes are moist.      Pharynx: No oropharyngeal exudate or posterior oropharyngeal erythema.   Eyes:      General:         Right eye: No discharge.         Left eye: No discharge.      Conjunctiva/sclera: Conjunctivae normal.   Cardiovascular:      Rate and Rhythm: Normal rate and regular rhythm.      Heart sounds: Normal heart sounds. No murmur heard.     No friction rub. No gallop.   Pulmonary:      Effort: Pulmonary effort is normal. No respiratory distress.

## 2024-10-08 NOTE — PATIENT INSTRUCTIONS
Right otitis media -  Start Augmentin, twice daily for 10 days  Continue over-the-counter daily antihistamine  Avoid Sudafed given your history of high blood pressure, recommend blood pressure friendly alternatives, look into the brand-name Coricidin  Can also consider a nasal steroid spray, such as Flonase  Call or return to clinic if no improvement or any worsening  If no improvement consider an ENT referral

## 2024-10-30 NOTE — PROGRESS NOTES
Chief Complaint   Patient presents with    Medication Management     follow up     1. Have you been to the ER, urgent care clinic since your last visit? Hospitalized since your last visit? No    2. Have you seen or consulted any other health care providers outside of the [address] [place] [address] since your last visit? Include any pap smears or colon screening.  No no...

## 2024-11-04 DIAGNOSIS — F41.9 ANXIETY: ICD-10-CM

## 2024-11-05 RX ORDER — DULOXETIN HYDROCHLORIDE 60 MG/1
60 CAPSULE, DELAYED RELEASE ORAL DAILY
Qty: 90 CAPSULE | Refills: 0 | Status: SHIPPED | OUTPATIENT
Start: 2024-11-05

## 2024-11-05 NOTE — TELEPHONE ENCOUNTER
PCP: Shi Duffy APRN - NP    Last appt: 5/9/2024   Future Appointments   Date Time Provider Department Center   11/21/2024  8:00 AM Shi Duffy APRN - NP [facility] DEP       Requested Prescriptions     Pending Prescriptions Disp Refills    DULoxetine (CYMBALTA) 60 MG extended release capsule [Pharmacy Med Name: DULoxetine HCl 60 MG Oral Capsule Delayed Release Particles] 90 capsule 0     Sig: Take 1 capsule by mouth once daily         Prior labs and Blood pressures:  BP Readings from Last 3 Encounters:   10/08/24 134/88   08/02/24 130/88   05/16/24 (!) 152/79     Lab Results   Component Value Date/Time     05/01/2024 11:16 AM    K 4.2 05/01/2024 11:16 AM     05/01/2024 11:16 AM    CO2 27 05/01/2024 11:16 AM    BUN 18 05/01/2024 11:16 AM    GFRAA >60 09/20/2022 08:42 AM     Lab Results   Component Value Date/Time    HCL9FIJT 6.4 02/29/2024 09:22 AM     Lab Results   Component Value Date/Time    CHOL 142 02/29/2024 10:01 AM    HDL 48 02/29/2024 10:01 AM    LDL 76.4 02/29/2024 10:01 AM    VLDL 17.6 02/29/2024 10:01 AM    VLDL 16 10/23/2020 12:00 AM     No results found for: \"VITD3\"    No results found for: \"TSH\", \"TSH2\", \"TSH3\"

## 2024-11-08 DIAGNOSIS — E11.65 TYPE 2 DIABETES MELLITUS WITH HYPERGLYCEMIA, WITHOUT LONG-TERM CURRENT USE OF INSULIN (HCC): ICD-10-CM

## 2024-11-08 DIAGNOSIS — I10 ESSENTIAL HYPERTENSION: ICD-10-CM

## 2024-11-12 RX ORDER — LOSARTAN POTASSIUM 50 MG/1
50 TABLET ORAL DAILY
Qty: 90 TABLET | Refills: 0 | Status: SHIPPED | OUTPATIENT
Start: 2024-11-12

## 2024-11-12 NOTE — TELEPHONE ENCOUNTER
PCP: Shi Duffy APRN - NP    Last appt: 5/9/2024   Future Appointments   Date Time Provider Department Center   11/21/2024  8:00 AM Shi Duffy [facility] - [facility]       Requested Prescriptions     Pending Prescriptions Disp Refills    losartan (COZAAR) 50 MG tablet [Pharmacy Med Name: Losartan Potassium 50 MG Oral Tablet] 90 tablet 0     Sig: Take 1 tablet by mouth once daily         Prior labs and Blood pressures:  BP Readings from Last 3 Encounters:   10/08/24 134/88   08/02/24 130/88   05/16/24 (!) 152/79     Lab Results   Component Value Date/Time     05/01/2024 11:16 AM    K 4.2 05/01/2024 11:16 AM     05/01/2024 11:16 AM    CO2 27 05/01/2024 11:16 AM    BUN 18 05/01/2024 11:16 AM    GFRAA >60 09/20/2022 08:42 AM     Lab Results   Component Value Date/Time    EZU1QDAZ 6.4 02/29/2024 09:22 AM     Lab Results   Component Value Date/Time    CHOL 142 02/29/2024 10:01 AM    HDL 48 02/29/2024 10:01 AM    LDL 76.4 02/29/2024 10:01 AM    VLDL 17.6 02/29/2024 10:01 AM    VLDL 16 10/23/2020 12:00 AM     No results found for: \"VITD3\"    No results found for: \"TSH\", \"TSH2\", \"TSH3\"

## 2024-11-21 ENCOUNTER — OFFICE VISIT (OUTPATIENT)
Age: 54
End: 2024-11-21

## 2024-11-21 VITALS
WEIGHT: 196.4 LBS | BODY MASS INDEX: 31.57 KG/M2 | TEMPERATURE: 97.8 F | OXYGEN SATURATION: 99 % | RESPIRATION RATE: 12 BRPM | HEIGHT: 66 IN | SYSTOLIC BLOOD PRESSURE: 132 MMHG | HEART RATE: 92 BPM | DIASTOLIC BLOOD PRESSURE: 90 MMHG

## 2024-11-21 DIAGNOSIS — Z11.59 NEED FOR HEPATITIS B SCREENING TEST: ICD-10-CM

## 2024-11-21 DIAGNOSIS — F41.9 ANXIETY: ICD-10-CM

## 2024-11-21 DIAGNOSIS — I10 ESSENTIAL HYPERTENSION: ICD-10-CM

## 2024-11-21 DIAGNOSIS — E78.2 MIXED HYPERLIPIDEMIA: ICD-10-CM

## 2024-11-21 DIAGNOSIS — Z23 NEEDS FLU SHOT: ICD-10-CM

## 2024-11-21 DIAGNOSIS — E11.65 TYPE 2 DIABETES MELLITUS WITH HYPERGLYCEMIA, WITHOUT LONG-TERM CURRENT USE OF INSULIN (HCC): Primary | ICD-10-CM

## 2024-11-21 LAB — HBA1C MFR BLD: 6.5 %

## 2024-11-21 RX ORDER — DULOXETIN HYDROCHLORIDE 60 MG/1
60 CAPSULE, DELAYED RELEASE ORAL DAILY
Qty: 90 CAPSULE | Refills: 1 | Status: SHIPPED | OUTPATIENT
Start: 2024-11-21

## 2024-11-21 RX ORDER — METFORMIN HYDROCHLORIDE 500 MG/1
500 TABLET, EXTENDED RELEASE ORAL
Qty: 90 TABLET | Refills: 1 | Status: SHIPPED | OUTPATIENT
Start: 2024-11-21

## 2024-11-21 RX ORDER — HYDROXYZINE HYDROCHLORIDE 25 MG/1
25 TABLET, FILM COATED ORAL 3 TIMES DAILY PRN
COMMUNITY
End: 2024-11-21 | Stop reason: SDUPTHER

## 2024-11-21 RX ORDER — HYDROXYZINE HYDROCHLORIDE 25 MG/1
25 TABLET, FILM COATED ORAL 3 TIMES DAILY PRN
Qty: 90 TABLET | Refills: 1 | Status: SHIPPED | OUTPATIENT
Start: 2024-11-21

## 2024-11-21 RX ORDER — ATORVASTATIN CALCIUM 40 MG/1
40 TABLET, FILM COATED ORAL DAILY
Qty: 90 TABLET | Refills: 1 | Status: SHIPPED | OUTPATIENT
Start: 2024-11-21

## 2024-11-21 RX ORDER — METOPROLOL SUCCINATE 100 MG/1
100 TABLET, EXTENDED RELEASE ORAL DAILY
Qty: 90 TABLET | Refills: 1 | Status: SHIPPED | OUTPATIENT
Start: 2024-11-21

## 2024-11-21 RX ORDER — LOSARTAN POTASSIUM 50 MG/1
50 TABLET ORAL DAILY
Qty: 90 TABLET | Refills: 1 | Status: SHIPPED | OUTPATIENT
Start: 2024-11-21

## 2024-11-21 SDOH — ECONOMIC STABILITY: INCOME INSECURITY: HOW HARD IS IT FOR YOU TO PAY FOR THE VERY BASICS LIKE FOOD, HOUSING, MEDICAL CARE, AND HEATING?: NOT HARD AT ALL

## 2024-11-21 SDOH — ECONOMIC STABILITY: FOOD INSECURITY: WITHIN THE PAST 12 MONTHS, YOU WORRIED THAT YOUR FOOD WOULD RUN OUT BEFORE YOU GOT MONEY TO BUY MORE.: NEVER TRUE

## 2024-11-21 SDOH — ECONOMIC STABILITY: FOOD INSECURITY: WITHIN THE PAST 12 MONTHS, THE FOOD YOU BOUGHT JUST DIDN'T LAST AND YOU DIDN'T HAVE MONEY TO GET MORE.: NEVER TRUE

## 2024-11-21 ASSESSMENT — PATIENT HEALTH QUESTIONNAIRE - PHQ9
SUM OF ALL RESPONSES TO PHQ QUESTIONS 1-9: 0
SUM OF ALL RESPONSES TO PHQ9 QUESTIONS 1 & 2: 0
1. LITTLE INTEREST OR PLEASURE IN DOING THINGS: NOT AT ALL
SUM OF ALL RESPONSES TO PHQ QUESTIONS 1-9: 0
2. FEELING DOWN, DEPRESSED OR HOPELESS: NOT AT ALL

## 2024-11-21 ASSESSMENT — ENCOUNTER SYMPTOMS
EYES NEGATIVE: 1
ALLERGIC/IMMUNOLOGIC NEGATIVE: 1

## 2024-11-21 NOTE — PROGRESS NOTES
Chief Complaint   Patient presents with    Diabetes     6 mnth f/u    Immunizations     Needs hep b?         \"Have you been to the ER, urgent care clinic since your last visit?  Hospitalized since your last visit?\"    Urgent care - couple months ago - right ear infection given abx    “Have you seen or consulted any other health care providers outside of  since your last visit?”    No    “Have you had a colorectal cancer screening such as a colonoscopy/FIT/Cologuard?    NO    No colonoscopy on file  No cologuard on file  Date of last FIT: 9/19/2023   No flexible sigmoidoscopy on file         Click Here for Release of Records Request           11/21/2024     8:18 AM   PHQ-9    Little interest or pleasure in doing things 0   Feeling down, depressed, or hopeless 0   PHQ-2 Score 0   PHQ-9 Total Score 0           Financial Resource Strain: Low Risk  (11/21/2024)    Overall Financial Resource Strain (CARDIA)     Difficulty of Paying Living Expenses: Not hard at all      Food Insecurity: No Food Insecurity (11/21/2024)    Hunger Vital Sign     Worried About Running Out of Food in the Last Year: Never true     Ran Out of Food in the Last Year: Never true          Health Maintenance Due   Topic Date Due    Pneumococcal 0-64 years Vaccine (2 of 2 - PCV) 10/21/2021    Flu vaccine (1) 08/01/2024    Diabetic Alb to Cr ratio (uACR) test  08/03/2024    COVID-19 Vaccine (3 - 2023-24 season) 09/01/2024    Colorectal Cancer Screen  09/19/2024    Hepatitis B vaccine (3 of 3 - 19+ 3-dose series) 11/01/2024

## 2024-11-21 NOTE — PROGRESS NOTES
I was present and reviewed with the nurse practitioner student the medical history and the findings on the physical examination.  I discussed with the nurse practitioner student the patient's diagnosis and concur with the plan.     ERIC Cobos - MERCEDEZ           [facility] Note     Sathish Lr (: 1970) is a 54 y.o. male, established patient, here for evaluation of the following chief complaint(s):  Diabetes (6 mnth f/u) and Immunizations (Needs hep b?)       ASSESSMENT/PLAN:  1. Type 2 diabetes mellitus with hyperglycemia, without long-term current use of insulin (HCC)  Assessment & Plan:   Chronic, at goal (stable), continue current treatment plan and lifestyle modifications recommended  24 A1c = 6.5  24 A1c = 6.4  8/3/2023 A1c = 5.9  2023 A1c = 6.0  Orders:  -      DIABETES FOOT EXAM  -    full sensation bilaterally, does feel like he had some decreased sensation at soles of feet compared to toes, educated patient to monitor this going forward and report any more changes  -     AMB POC HEMOGLOBIN A1C  -     atorvastatin (LIPITOR) 40 MG tablet; Take 1 tablet by mouth daily, Disp-90 tablet, R-1Normal  -     losartan (COZAAR) 50 MG tablet; Take 1 tablet by mouth daily, Disp-90 tablet, R-1Normal  -     metFORMIN (GLUCOPHAGE-XR) 500 MG extended release tablet; Take 1 tablet by mouth daily (with breakfast), Disp-90 tablet, R-1Normal  -     Comprehensive Metabolic Panel; Future  -     Microalbumin / Creatinine Urine Ratio; Future    2. Mixed hyperlipidemia  -     atorvastatin (LIPITOR) 40 MG tablet; Take 1 tablet by mouth daily, Disp-90 tablet, R-1Normal  -     losartan (COZAAR) 50 MG tablet; Take 1 tablet by mouth daily, Disp-90 tablet, R-1Normal  Lab Results   Component Value Date    CHOL 142 2024    TRIG 88 2024    HDL 48 2024    LDL 76.4 2024    VLDL 17.6 2024    CHOLHDLRATIO 3.0 2024       3. Essential hypertension  -

## 2024-11-21 NOTE — ASSESSMENT & PLAN NOTE
Chronic, at goal (stable), continue current treatment plan and lifestyle modifications recommended  11/21/24 A1c = 6.5  2/29/24 A1c = 6.4  8/3/2023 A1c = 5.9  2/11/2023 A1c = 6.0

## 2024-11-22 LAB
ALBUMIN SERPL-MCNC: 4.1 G/DL (ref 3.5–5)
ALBUMIN/GLOB SERPL: 1.2 (ref 1.1–2.2)
ALP SERPL-CCNC: 93 U/L (ref 45–117)
ALT SERPL-CCNC: 46 U/L (ref 12–78)
ANION GAP SERPL CALC-SCNC: 5 MMOL/L (ref 2–12)
AST SERPL-CCNC: 29 U/L (ref 15–37)
BILIRUB SERPL-MCNC: 0.6 MG/DL (ref 0.2–1)
BUN SERPL-MCNC: 15 MG/DL (ref 6–20)
BUN/CREAT SERPL: 13 (ref 12–20)
CALCIUM SERPL-MCNC: 9.3 MG/DL (ref 8.5–10.1)
CHLORIDE SERPL-SCNC: 105 MMOL/L (ref 97–108)
CO2 SERPL-SCNC: 28 MMOL/L (ref 21–32)
CREAT SERPL-MCNC: 1.12 MG/DL (ref 0.7–1.3)
CREAT UR-MCNC: 263 MG/DL
GLOBULIN SER CALC-MCNC: 3.5 G/DL (ref 2–4)
GLUCOSE SERPL-MCNC: 144 MG/DL (ref 65–100)
MICROALBUMIN UR-MCNC: 1.73 MG/DL
MICROALBUMIN/CREAT UR-RTO: 7 MG/G (ref 0–30)
POTASSIUM SERPL-SCNC: 4.1 MMOL/L (ref 3.5–5.1)
PROT SERPL-MCNC: 7.6 G/DL (ref 6.4–8.2)
SODIUM SERPL-SCNC: 138 MMOL/L (ref 136–145)
SPECIMEN HOLD: NORMAL

## 2025-07-03 DIAGNOSIS — E11.65 TYPE 2 DIABETES MELLITUS WITH HYPERGLYCEMIA, WITHOUT LONG-TERM CURRENT USE OF INSULIN (HCC): ICD-10-CM

## 2025-07-03 DIAGNOSIS — I10 ESSENTIAL HYPERTENSION: ICD-10-CM

## 2025-07-10 ENCOUNTER — OFFICE VISIT (OUTPATIENT)
Age: 55
End: 2025-07-10
Payer: COMMERCIAL

## 2025-07-10 VITALS
BODY MASS INDEX: 31.08 KG/M2 | WEIGHT: 193.4 LBS | TEMPERATURE: 97.3 F | HEIGHT: 66 IN | DIASTOLIC BLOOD PRESSURE: 86 MMHG | SYSTOLIC BLOOD PRESSURE: 130 MMHG | OXYGEN SATURATION: 97 % | HEART RATE: 93 BPM | RESPIRATION RATE: 16 BRPM

## 2025-07-10 DIAGNOSIS — E78.2 MIXED HYPERLIPIDEMIA: ICD-10-CM

## 2025-07-10 DIAGNOSIS — Z12.5 PROSTATE CANCER SCREENING: Primary | ICD-10-CM

## 2025-07-10 DIAGNOSIS — E11.65 TYPE 2 DIABETES MELLITUS WITH HYPERGLYCEMIA, WITHOUT LONG-TERM CURRENT USE OF INSULIN (HCC): ICD-10-CM

## 2025-07-10 DIAGNOSIS — Z12.11 ENCOUNTER FOR SCREENING FOR MALIGNANT NEOPLASM OF COLON: ICD-10-CM

## 2025-07-10 DIAGNOSIS — Z23 NEED FOR PNEUMOCOCCAL VACCINATION: ICD-10-CM

## 2025-07-10 DIAGNOSIS — I10 ESSENTIAL HYPERTENSION: ICD-10-CM

## 2025-07-10 DIAGNOSIS — F41.9 ANXIETY: ICD-10-CM

## 2025-07-10 LAB — HBA1C MFR BLD: 6.3 %

## 2025-07-10 PROCEDURE — 3075F SYST BP GE 130 - 139MM HG: CPT | Performed by: NURSE PRACTITIONER

## 2025-07-10 PROCEDURE — 83036 HEMOGLOBIN GLYCOSYLATED A1C: CPT | Performed by: NURSE PRACTITIONER

## 2025-07-10 PROCEDURE — 90471 IMMUNIZATION ADMIN: CPT | Performed by: NURSE PRACTITIONER

## 2025-07-10 PROCEDURE — 3079F DIAST BP 80-89 MM HG: CPT | Performed by: NURSE PRACTITIONER

## 2025-07-10 PROCEDURE — 3044F HG A1C LEVEL LT 7.0%: CPT | Performed by: NURSE PRACTITIONER

## 2025-07-10 PROCEDURE — 99214 OFFICE O/P EST MOD 30 MIN: CPT | Performed by: NURSE PRACTITIONER

## 2025-07-10 PROCEDURE — 90677 PCV20 VACCINE IM: CPT | Performed by: NURSE PRACTITIONER

## 2025-07-10 RX ORDER — METFORMIN HYDROCHLORIDE 500 MG/1
500 TABLET, EXTENDED RELEASE ORAL
Qty: 90 TABLET | Refills: 1 | Status: SHIPPED | OUTPATIENT
Start: 2025-07-10

## 2025-07-10 RX ORDER — METFORMIN HYDROCHLORIDE 500 MG/1
500 TABLET, EXTENDED RELEASE ORAL
Qty: 90 TABLET | Refills: 1 | OUTPATIENT
Start: 2025-07-10

## 2025-07-10 RX ORDER — DULOXETIN HYDROCHLORIDE 60 MG/1
60 CAPSULE, DELAYED RELEASE ORAL DAILY
Qty: 90 CAPSULE | Refills: 1 | Status: SHIPPED | OUTPATIENT
Start: 2025-07-10

## 2025-07-10 RX ORDER — METFORMIN HYDROCHLORIDE 500 MG/1
500 TABLET, EXTENDED RELEASE ORAL
Qty: 90 TABLET | Refills: 1 | Status: SHIPPED | OUTPATIENT
Start: 2025-07-10 | End: 2025-07-10 | Stop reason: SDUPTHER

## 2025-07-10 RX ORDER — METOPROLOL SUCCINATE 100 MG/1
100 TABLET, EXTENDED RELEASE ORAL DAILY
Qty: 90 TABLET | Refills: 1 | Status: SHIPPED | OUTPATIENT
Start: 2025-07-10 | End: 2025-07-10 | Stop reason: SDUPTHER

## 2025-07-10 RX ORDER — LOSARTAN POTASSIUM 50 MG/1
50 TABLET ORAL DAILY
Qty: 90 TABLET | Refills: 1 | Status: SHIPPED | OUTPATIENT
Start: 2025-07-10

## 2025-07-10 RX ORDER — METOPROLOL SUCCINATE 100 MG/1
100 TABLET, EXTENDED RELEASE ORAL DAILY
Qty: 90 TABLET | Refills: 1 | Status: SHIPPED | OUTPATIENT
Start: 2025-07-10

## 2025-07-10 RX ORDER — ATORVASTATIN CALCIUM 40 MG/1
40 TABLET, FILM COATED ORAL DAILY
Qty: 90 TABLET | Refills: 1 | Status: SHIPPED | OUTPATIENT
Start: 2025-07-10

## 2025-07-10 RX ORDER — METOPROLOL SUCCINATE 100 MG/1
100 TABLET, EXTENDED RELEASE ORAL DAILY
Qty: 90 TABLET | Refills: 1 | OUTPATIENT
Start: 2025-07-10

## 2025-07-10 SDOH — ECONOMIC STABILITY: FOOD INSECURITY: WITHIN THE PAST 12 MONTHS, THE FOOD YOU BOUGHT JUST DIDN'T LAST AND YOU DIDN'T HAVE MONEY TO GET MORE.: NEVER TRUE

## 2025-07-10 SDOH — ECONOMIC STABILITY: FOOD INSECURITY: WITHIN THE PAST 12 MONTHS, YOU WORRIED THAT YOUR FOOD WOULD RUN OUT BEFORE YOU GOT MONEY TO BUY MORE.: NEVER TRUE

## 2025-07-10 SDOH — ECONOMIC STABILITY: TRANSPORTATION INSECURITY
IN THE PAST 12 MONTHS, HAS THE LACK OF TRANSPORTATION KEPT YOU FROM MEDICAL APPOINTMENTS OR FROM GETTING MEDICATIONS?: NO

## 2025-07-10 SDOH — ECONOMIC STABILITY: INCOME INSECURITY: IN THE LAST 12 MONTHS, WAS THERE A TIME WHEN YOU WERE NOT ABLE TO PAY THE MORTGAGE OR RENT ON TIME?: NO

## 2025-07-10 ASSESSMENT — PATIENT HEALTH QUESTIONNAIRE - PHQ9
2. FEELING DOWN, DEPRESSED OR HOPELESS: NOT AT ALL
SUM OF ALL RESPONSES TO PHQ QUESTIONS 1-9: 0
1. LITTLE INTEREST OR PLEASURE IN DOING THINGS: NOT AT ALL

## 2025-07-10 NOTE — ASSESSMENT & PLAN NOTE
Chronic. Stable. At goal of <7.0. No medication changes at this time  7/10/2025 A1c =6.3  11/21/24 A1c = 6.5  2/29/24 A1c = 6.4  8/3/2023 A1c = 5.9  Orders:    AMB POC HEMOGLOBIN A1C    Comprehensive Metabolic Panel; Future    Lipid Panel; Future    Albumin/Creatinine Ratio, Urine; Future    atorvastatin (LIPITOR) 40 MG tablet; Take 1 tablet by mouth daily    losartan (COZAAR) 50 MG tablet; Take 1 tablet by mouth daily    metFORMIN (GLUCOPHAGE-XR) 500 MG extended release tablet; Take 1 tablet by mouth daily (with breakfast)

## 2025-07-10 NOTE — ASSESSMENT & PLAN NOTE
- Stable. No dose adjustment at this time.   Orders:    DULoxetine (CYMBALTA) 60 MG extended release capsule; Take 1 capsule by mouth daily

## 2025-07-10 NOTE — ASSESSMENT & PLAN NOTE
- Due for fasting labs  Lab Results   Component Value Date    CHOL 142 02/29/2024    TRIG 88 02/29/2024    HDL 48 02/29/2024    LDL 76.4 02/29/2024    VLDL 17.6 02/29/2024    CHOLHDLRATIO 3.0 02/29/2024   Orders:    atorvastatin (LIPITOR) 40 MG tablet; Take 1 tablet by mouth daily    losartan (COZAAR) 50 MG tablet; Take 1 tablet by mouth daily

## 2025-07-10 NOTE — PROGRESS NOTES
Chief Complaint   Patient presents with    Medication Refill     Metformin & metoprolol         \"Have you been to the ER, urgent care clinic since your last visit?  Hospitalized since your last visit?\"    NO    “Have you seen or consulted any other health care providers outside of [facility] since your last visit?”    NO      “Have you had a colorectal cancer screening such as a colonoscopy/FIT/Cologuard?    NO    No colonoscopy on file  No cologuard on file  Date of last FIT: 9/19/2023   No flexible sigmoidoscopy on file         Click Here for Release of Records Request           7/10/2025     4:43 PM   PHQ-9    Little interest or pleasure in doing things 0   Feeling down, depressed, or hopeless 0   PHQ-2 Score 0   PHQ-9 Total Score 0           Financial Resource Strain: Low Risk  (11/21/2024)    Overall Financial Resource Strain (CARDIA)     Difficulty of Paying Living Expenses: Not hard at all      Food Insecurity: No Food Insecurity (7/10/2025)    Hunger Vital Sign     Worried About Running Out of Food in the Last Year: Never true     Ran Out of Food in the Last Year: Never true          Health Maintenance Due   Topic Date Due    Pneumococcal 50+ years Vaccine (2 of 2 - PCV) 10/21/2021    COVID-19 Vaccine (3 - 2024-25 season) 09/01/2024    Colorectal Cancer Screen  09/19/2024    Lipids  02/28/2025

## 2025-07-10 NOTE — PROGRESS NOTES
[facility]  Clinic Note     Sathish Lr (: 1970) is a 54 y.o. male, established patient, here for evaluation of the following chief complaint(s):  Medication Refill (Metformin & metoprolol)       ASSESSMENT/PLAN:    Assessment & Plan  Type 2 diabetes mellitus with hyperglycemia, without long-term current use of insulin (HCC)   Chronic. Stable. At goal of <7.0. No medication changes at this time  7/10/2025 A1c =6.3  24 A1c = 6.5  24 A1c = 6.4  8/3/2023 A1c = 5.9  Orders:    AMB POC HEMOGLOBIN A1C    Comprehensive Metabolic Panel; Future    Lipid Panel; Future    Albumin/Creatinine Ratio, Urine; Future    atorvastatin (LIPITOR) 40 MG tablet; Take 1 tablet by mouth daily    losartan (COZAAR) 50 MG tablet; Take 1 tablet by mouth daily    metFORMIN (GLUCOPHAGE-XR) 500 MG extended release tablet; Take 1 tablet by mouth daily (with breakfast)    Essential hypertension   - Controlled. No dose adjustment at this time  Orders:    Comprehensive Metabolic Panel; Future    losartan (COZAAR) 50 MG tablet; Take 1 tablet by mouth daily    metoprolol succinate (TOPROL XL) 100 MG extended release tablet; Take 1 tablet by mouth daily    Prostate cancer screening   Orders:    PSA Screening; Future    Mixed hyperlipidemia   - Due for fasting labs  Lab Results   Component Value Date    CHOL 142 2024    TRIG 88 2024    HDL 48 2024    LDL 76.4 2024    VLDL 17.6 2024    CHOLHDLRATIO 3.0 2024   Orders:    atorvastatin (LIPITOR) 40 MG tablet; Take 1 tablet by mouth daily    losartan (COZAAR) 50 MG tablet; Take 1 tablet by mouth daily    Anxiety   - Stable. No dose adjustment at this time.   Orders:    DULoxetine (CYMBALTA) 60 MG extended release capsule; Take 1 capsule by mouth daily    Need for pneumococcal vaccination   Orders:    Pneumococcal, PCV20, PREVNAR 20, (age 6w+), IM, PF    Encounter for screening for malignant neoplasm of colon   Orders:    [facility],

## 2025-07-17 DIAGNOSIS — E11.65 TYPE 2 DIABETES MELLITUS WITH HYPERGLYCEMIA, WITHOUT LONG-TERM CURRENT USE OF INSULIN (HCC): ICD-10-CM

## 2025-07-17 DIAGNOSIS — I10 ESSENTIAL HYPERTENSION: ICD-10-CM

## 2025-07-17 DIAGNOSIS — Z12.5 PROSTATE CANCER SCREENING: ICD-10-CM

## 2025-07-20 LAB
ALBUMIN SERPL-MCNC: 4.1 G/DL (ref 3.5–5)
ALBUMIN/GLOB SERPL: 1.3 (ref 1.1–2.2)
ALP SERPL-CCNC: 81 U/L (ref 45–117)
ALT SERPL-CCNC: 42 U/L (ref 12–78)
ANION GAP SERPL CALC-SCNC: 10 MMOL/L (ref 2–12)
AST SERPL-CCNC: 25 U/L (ref 15–37)
BILIRUB SERPL-MCNC: 0.4 MG/DL (ref 0.2–1)
BUN SERPL-MCNC: 20 MG/DL (ref 6–20)
BUN/CREAT SERPL: 18 (ref 12–20)
CALCIUM SERPL-MCNC: 9.3 MG/DL (ref 8.5–10.1)
CHLORIDE SERPL-SCNC: 106 MMOL/L (ref 97–108)
CHOLEST SERPL-MCNC: 162 MG/DL
CO2 SERPL-SCNC: 22 MMOL/L (ref 21–32)
CREAT SERPL-MCNC: 1.1 MG/DL (ref 0.7–1.3)
CREAT UR-MCNC: 218 MG/DL
GLOBULIN SER CALC-MCNC: 3.2 G/DL (ref 2–4)
GLUCOSE SERPL-MCNC: 140 MG/DL (ref 65–100)
HDLC SERPL-MCNC: 53 MG/DL
HDLC SERPL: 3.1 (ref 0–5)
LDLC SERPL CALC-MCNC: 95.2 MG/DL (ref 0–100)
MICROALBUMIN UR-MCNC: 0.92 MG/DL
MICROALBUMIN/CREAT UR-RTO: 4 MG/G (ref 0–30)
POTASSIUM SERPL-SCNC: 4.3 MMOL/L (ref 3.5–5.1)
PROT SERPL-MCNC: 7.3 G/DL (ref 6.4–8.2)
PSA SERPL-MCNC: 0.4 NG/ML (ref 0.01–4)
SODIUM SERPL-SCNC: 138 MMOL/L (ref 136–145)
TRIGL SERPL-MCNC: 69 MG/DL
VLDLC SERPL CALC-MCNC: 13.8 MG/DL

## 2025-07-24 ENCOUNTER — TELEPHONE (OUTPATIENT)
Age: 55
End: 2025-07-24

## 2025-07-24 NOTE — TELEPHONE ENCOUNTER
Spoke with patient about scheduling colonoscopy - he is going to back when he is ready to schedule.

## (undated) DEVICE — 4-PORT MANIFOLD: Brand: NEPTUNE 2

## (undated) DEVICE — SUTURE VICRYL SZ 2-0 L36IN ABSRB UD L36MM CT-1 1/2 CIR J945H

## (undated) DEVICE — GOWN,SIRUS,NONRNF,XLN/2XL,18/CS: Brand: MEDLINE

## (undated) DEVICE — SNAP KOVER: Brand: UNBRANDED

## (undated) DEVICE — SOLUTION SURG PREP 26 CC PURPREP

## (undated) DEVICE — STRIP,CLOSURE,WOUND,MEDI-STRIP,1/2X4: Brand: MEDLINE

## (undated) DEVICE — LIQUIBAND RAPID ADHESIVE 36/CS 0.8ML: Brand: MEDLINE

## (undated) DEVICE — SUTURE PERMAHAND SZ 3-0 L30IN NONABSORBABLE BLK SILK BRAID A304H

## (undated) DEVICE — BANDAGE COBAN 4 IN COMPR W4INXL5YD FOAM COHESIVE QUIK STK SELF ADH SFT

## (undated) DEVICE — 3M™ TEGADERM™ TRANSPARENT FILM DRESSING FRAME STYLE, 1626W, 4 IN X 4-3/4 IN (10 CM X 12 CM), 50/CT 4CT/CASE: Brand: 3M™ TEGADERM™

## (undated) DEVICE — SLING ARM AD UNIV

## (undated) DEVICE — GLOVE SURG SZ 85 L12IN FNGR ORTHO 126MIL CRM LTX FREE

## (undated) DEVICE — BANDAGE,ELASTIC,ESMARK,STERILE,4"X9',LF: Brand: MEDLINE

## (undated) DEVICE — STOCKINETTE,IMPERVIOUS,12X48,STERILE: Brand: MEDLINE

## (undated) DEVICE — EXTREMITY-MRMC: Brand: MEDLINE INDUSTRIES, INC.

## (undated) DEVICE — TUBING SUCT 12FR MAL ALUM SHFT FN CAP VENT UNIV CONN W/ OBT

## (undated) DEVICE — BANDAGE COMPR W4INXL5YD WHT BGE POLY COT M E WRP WV HK AND

## (undated) DEVICE — SUTURE MONOCRYL SZ 3-0 L27IN ABSRB UD L24MM PS-1 3/8 CIR PRIM Y936H

## (undated) DEVICE — SOLUTION IRRIG 500ML 0.9% SOD CHLO USP POUR PLAS BTL

## (undated) DEVICE — SOLUTION SCRB 4% CHG RED ANTIMIC SKIN CLN PREOPERATIVE DISP

## (undated) DEVICE — GLOVE SURG SZ 85 L12IN FNGR THK79MIL GRN LTX FREE